# Patient Record
Sex: MALE | Race: ASIAN | NOT HISPANIC OR LATINO | URBAN - METROPOLITAN AREA
[De-identification: names, ages, dates, MRNs, and addresses within clinical notes are randomized per-mention and may not be internally consistent; named-entity substitution may affect disease eponyms.]

---

## 2017-10-17 ENCOUNTER — EMERGENCY (EMERGENCY)
Facility: HOSPITAL | Age: 72
LOS: 1 days | Discharge: HOME | End: 2017-10-17
Attending: EMERGENCY MEDICINE | Admitting: EMERGENCY MEDICINE
Payer: COMMERCIAL

## 2017-10-17 VITALS
RESPIRATION RATE: 14 BRPM | TEMPERATURE: 97 F | DIASTOLIC BLOOD PRESSURE: 72 MMHG | HEART RATE: 86 BPM | OXYGEN SATURATION: 100 % | SYSTOLIC BLOOD PRESSURE: 176 MMHG

## 2017-10-17 VITALS
RESPIRATION RATE: 18 BRPM | DIASTOLIC BLOOD PRESSURE: 77 MMHG | SYSTOLIC BLOOD PRESSURE: 120 MMHG | TEMPERATURE: 98 F | HEART RATE: 82 BPM | OXYGEN SATURATION: 98 %

## 2017-10-17 PROCEDURE — 99284 EMERGENCY DEPT VISIT MOD MDM: CPT

## 2017-10-17 NOTE — ED PROVIDER NOTE - MUSCULOSKELETAL, MLM
Spine appears normal, range of motion is not limited, no muscle or joint tenderness  MIld pain with movement of left leg; no focal tenderness; pelvis stable
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
## 2021-02-12 ENCOUNTER — FORM ENCOUNTER (OUTPATIENT)
Age: 76
End: 2021-02-12

## 2021-02-13 ENCOUNTER — TRANSCRIPTION ENCOUNTER (OUTPATIENT)
Age: 76
End: 2021-02-13

## 2021-02-13 ENCOUNTER — EMERGENCY (EMERGENCY)
Facility: HOSPITAL | Age: 76
LOS: 1 days | Discharge: HOME | End: 2021-02-13
Attending: EMERGENCY MEDICINE | Admitting: EMERGENCY MEDICINE
Payer: MEDICARE

## 2021-02-13 VITALS
SYSTOLIC BLOOD PRESSURE: 121 MMHG | TEMPERATURE: 98 F | HEART RATE: 94 BPM | DIASTOLIC BLOOD PRESSURE: 57 MMHG | RESPIRATION RATE: 18 BRPM | OXYGEN SATURATION: 94 %

## 2021-02-13 LAB
ALBUMIN SERPL ELPH-MCNC: 3.5 G/DL — SIGNIFICANT CHANGE UP (ref 3.3–5)
ALP SERPL-CCNC: 68 U/L — SIGNIFICANT CHANGE UP (ref 40–120)
ALT FLD-CCNC: 53 U/L — HIGH (ref 4–41)
ANION GAP SERPL CALC-SCNC: 9 MMOL/L — SIGNIFICANT CHANGE UP (ref 7–14)
AST SERPL-CCNC: 57 U/L — HIGH (ref 4–40)
BASOPHILS # BLD AUTO: 0.01 K/UL — SIGNIFICANT CHANGE UP (ref 0–0.2)
BASOPHILS NFR BLD AUTO: 0.2 % — SIGNIFICANT CHANGE UP (ref 0–2)
BILIRUB SERPL-MCNC: 0.6 MG/DL — SIGNIFICANT CHANGE UP (ref 0.2–1.2)
BUN SERPL-MCNC: 16 MG/DL — SIGNIFICANT CHANGE UP (ref 7–23)
CALCIUM SERPL-MCNC: 9 MG/DL — SIGNIFICANT CHANGE UP (ref 8.4–10.5)
CHLORIDE SERPL-SCNC: 100 MMOL/L — SIGNIFICANT CHANGE UP (ref 98–107)
CO2 SERPL-SCNC: 25 MMOL/L — SIGNIFICANT CHANGE UP (ref 22–31)
CREAT SERPL-MCNC: 1.11 MG/DL — SIGNIFICANT CHANGE UP (ref 0.5–1.3)
EOSINOPHIL # BLD AUTO: 0.09 K/UL — SIGNIFICANT CHANGE UP (ref 0–0.5)
EOSINOPHIL NFR BLD AUTO: 1.5 % — SIGNIFICANT CHANGE UP (ref 0–6)
GLUCOSE SERPL-MCNC: 107 MG/DL — HIGH (ref 70–99)
HCT VFR BLD CALC: 42.1 % — SIGNIFICANT CHANGE UP (ref 39–50)
HGB BLD-MCNC: 13.8 G/DL — SIGNIFICANT CHANGE UP (ref 13–17)
IANC: 4.18 K/UL — SIGNIFICANT CHANGE UP (ref 1.5–8.5)
IMM GRANULOCYTES NFR BLD AUTO: 0.3 % — SIGNIFICANT CHANGE UP (ref 0–1.5)
LYMPHOCYTES # BLD AUTO: 1.03 K/UL — SIGNIFICANT CHANGE UP (ref 1–3.3)
LYMPHOCYTES # BLD AUTO: 17.2 % — SIGNIFICANT CHANGE UP (ref 13–44)
MCHC RBC-ENTMCNC: 30.6 PG — SIGNIFICANT CHANGE UP (ref 27–34)
MCHC RBC-ENTMCNC: 32.8 GM/DL — SIGNIFICANT CHANGE UP (ref 32–36)
MCV RBC AUTO: 93.3 FL — SIGNIFICANT CHANGE UP (ref 80–100)
MONOCYTES # BLD AUTO: 0.66 K/UL — SIGNIFICANT CHANGE UP (ref 0–0.9)
MONOCYTES NFR BLD AUTO: 11 % — SIGNIFICANT CHANGE UP (ref 2–14)
NEUTROPHILS # BLD AUTO: 4.18 K/UL — SIGNIFICANT CHANGE UP (ref 1.8–7.4)
NEUTROPHILS NFR BLD AUTO: 69.8 % — SIGNIFICANT CHANGE UP (ref 43–77)
NRBC # BLD: 0 /100 WBCS — SIGNIFICANT CHANGE UP
NRBC # FLD: 0 K/UL — SIGNIFICANT CHANGE UP
PLATELET # BLD AUTO: 187 K/UL — SIGNIFICANT CHANGE UP (ref 150–400)
POTASSIUM SERPL-MCNC: 4.7 MMOL/L — SIGNIFICANT CHANGE UP (ref 3.5–5.3)
POTASSIUM SERPL-SCNC: 4.7 MMOL/L — SIGNIFICANT CHANGE UP (ref 3.5–5.3)
PROT SERPL-MCNC: 7.2 G/DL — SIGNIFICANT CHANGE UP (ref 6–8.3)
RBC # BLD: 4.51 M/UL — SIGNIFICANT CHANGE UP (ref 4.2–5.8)
RBC # FLD: 13.4 % — SIGNIFICANT CHANGE UP (ref 10.3–14.5)
SARS-COV-2 RNA SPEC QL NAA+PROBE: DETECTED
SODIUM SERPL-SCNC: 134 MMOL/L — LOW (ref 135–145)
WBC # BLD: 5.99 K/UL — SIGNIFICANT CHANGE UP (ref 3.8–10.5)
WBC # FLD AUTO: 5.99 K/UL — SIGNIFICANT CHANGE UP (ref 3.8–10.5)

## 2021-02-13 PROCEDURE — 99284 EMERGENCY DEPT VISIT MOD MDM: CPT | Mod: GC

## 2021-02-13 PROCEDURE — 71045 X-RAY EXAM CHEST 1 VIEW: CPT | Mod: 26

## 2021-02-13 RX ORDER — SODIUM CHLORIDE 9 MG/ML
1000 INJECTION INTRAMUSCULAR; INTRAVENOUS; SUBCUTANEOUS ONCE
Refills: 0 | Status: COMPLETED | OUTPATIENT
Start: 2021-02-13 | End: 2021-02-13

## 2021-02-13 RX ADMIN — Medication 100 MILLIGRAM(S): at 06:44

## 2021-02-13 RX ADMIN — SODIUM CHLORIDE 1000 MILLILITER(S): 9 INJECTION INTRAMUSCULAR; INTRAVENOUS; SUBCUTANEOUS at 05:06

## 2021-02-13 NOTE — ED PROVIDER NOTE - NS ED ROS FT
General: denies fever, chills  HENT: denies nasal congestion, rhinorrhea  Eyes: denies visual changes, blurred vision  CV: denies chest pain, palpitations  Resp: denies difficulty breathing, cough  Abdominal: denies nausea, vomiting, diarrhea, abdominal pain  : denies urinary pain or discharge  MSK: denies muscle aches, leg swelling  Neuro: weak  Skin: denies rashes, bruises

## 2021-02-13 NOTE — ED ADULT TRIAGE NOTE - CHIEF COMPLAINT QUOTE
Patient diagnosed with Covid on 02/01/21 and experiencing weakness since his diagnose.  Yesterday, He developed nausea, malaise and states, " he doesn't feel well".  Denies SOB or chest pain. Patient diagnosed with Covid on 02/01/21 and experiencing weakness since his diagnoses.  Yesterday, He developed nausea, malaise and states, " he doesn't feel well".  Denies SOB or chest pain.

## 2021-02-13 NOTE — ED PROVIDER NOTE - PROGRESS NOTE DETAILS
Renny, PGY1: Patient's ambulatory saturation was 92+%. Will d/c on prednisone and tessalon pearls and strict return precautions

## 2021-02-13 NOTE — ED PROVIDER NOTE - PATIENT PORTAL LINK FT
You can access the FollowMyHealth Patient Portal offered by [facility] by registering at the following website: [email or web address]. By joining [facility]’s FollowMyHealth portal, you will also be able to view your health information using other applications (apps) compatible with our system.

## 2021-02-13 NOTE — ED ADULT NURSE NOTE - CHIEF COMPLAINT QUOTE
Patient diagnosed with Covid on 02/01/21 and experiencing weakness since his diagnoses.  Yesterday, He developed nausea, malaise and states, " he doesn't feel well".  Denies SOB or chest pain.

## 2021-02-13 NOTE — ED PROVIDER NOTE - ATTENDING CONTRIBUTION TO CARE
MD Ortiz:  I performed a face to face bedside interview with patient regarding history of present illness, review of symptoms and past medical history. I completed an independent physical exam(documented below).  I have discussed patient's plan of care with resident.   I agree with note as stated above, having amended the EMR as needed to reflect my findings. I have discussed the assessment and plan of care.  This includes during the time I functioned as the attending physician for this patient.  PE:  Gen: Alert, NAD  Head: NC, AT,  EOMI, normal lids/conjunctiva  ENT:  normal hearing, patent oropharynx without erythema/exudate  Neck: +supple, no tenderness/meningismus/JVD, +Trachea midline  Chest: no chest wall tenderness, equal chest rise  Pulm: Bilateral BS, normal resp effort, no wheeze/stridor/retractions, mild crackles in posterior bases  CV: RRR, no M/R/G, +dist pulses  Abd: +BS, soft, NT/ND  Rectal: deferred  Mskel: no edema/erythema/cyanosis  Skin: no rash  Neuro: AAOx3  MDM:  76yo M  currently on day 13 or 14 of covid symptoms, c/o general weakness and diarrhea x1 day. Amb sat 93% on RA. Looks well. labs, cxr, fluid, reassess and likely dc w/ meds and return precautions.

## 2021-02-13 NOTE — ED PROVIDER NOTE - NSFOLLOWUPINSTRUCTIONS_ED_ALL_ED_FT
For a 14 day period:  - Please take the prednisone prescription sent to your pharmacy 2 times a day  - Tessalon pearls were also sent to your pharmacy for symptomatic control of your cough  -Stay inside your home as much as possible, avoiding public places or public interaction  -Do not go to work  -If you do enter any public domain, at minimum wear a surgical mask at all times  -Even while indoors, attempt to remain isolated from other individuals such as family or friends, as much as possible  -Return to the Emergency Department for any symptoms such as worsening shortness of breath, significant worsening cough, high fevers despite acetaminophen (Tylenol) or ibuprofen (Motrin/Advil), or severe weakness/malaise

## 2021-02-13 NOTE — ED PROVIDER NOTE - OBJECTIVE STATEMENT
74yo M w/ no significant pmh coming in for weakness and loss of taste since testing + for COVID on 2/1/21. Patient denies fevers/chills, chest pain, or SOB. Home O2 Sat have been 96+%. Patient attempts to stay hydrated but notes decreased PO intake secondary to ageusia. Also notes diarrhea x1 day that occurred 2 days ago but self resolved.

## 2021-02-13 NOTE — ED ADULT NURSE NOTE - OBJECTIVE STATEMENT
Pt arriving to room 21 A&Ox4 ambulatory c/o generalized weakness and decreased PO intake since testing COVID+ on 2/1. RR even and unlabored. Pt denies SOB, CP. Pt states he came to ED because his family was concerned and he feels "dehydrated". O2 sat 95% on RA. IV established with 20G in LAC. Labs drawn and sent. Fluids running as per eMAR. Will continue to monitor.

## 2021-02-13 NOTE — ED PROVIDER NOTE - CLINICAL SUMMARY MEDICAL DECISION MAKING FREE TEXT BOX
76yo M w/ no significant pmh coming in for weakness and loss of taste since testing + for COVID on 2/1/21. Patient's vitals are wnl and not hypoxic on exam--maintained 95+% O2 saturation. Given decreased PO intake, will r/o electrolyte abnormalities vs anemia   Plan: CBC/CMP chest xray + IV hydration

## 2021-02-13 NOTE — ED PROVIDER NOTE - PHYSICAL EXAMINATION
GENERAL: well appearing in no acute distress, non-toxic appearing  HEAD: normocephalic, atraumatic  HENT: airway intact  EYES: normal conjunctiva, oral mucosa moist  CARDIAC: regular rate and rhythm, normal S1S2, no appreciable murmurs, 2+ pulses in UE/LE b/l  PULM: normal breath sounds, clear to ascultation bilaterally, no rales, rhonchi, wheezing  GI: abdomen nondistended, soft, nontender, no guarding, rebound tenderness  NEURO: no focal motor or sensory deficits  MSK: no peripheral edema, no calf tenderness b/l  SKIN: well-perfused, extremities warm, no visible rashes  PSYCH: appropriate mood and affect

## 2021-02-14 ENCOUNTER — OUTPATIENT (OUTPATIENT)
Dept: OUTPATIENT SERVICES | Facility: HOSPITAL | Age: 76
LOS: 1 days | End: 2021-02-14
Payer: SELF-PAY

## 2021-02-14 ENCOUNTER — APPOINTMENT (OUTPATIENT)
Dept: DISASTER EMERGENCY | Facility: HOSPITAL | Age: 76
End: 2021-02-14

## 2021-02-14 VITALS
RESPIRATION RATE: 18 BRPM | DIASTOLIC BLOOD PRESSURE: 73 MMHG | TEMPERATURE: 97 F | SYSTOLIC BLOOD PRESSURE: 150 MMHG | HEART RATE: 85 BPM | WEIGHT: 149.91 LBS | OXYGEN SATURATION: 93 % | HEIGHT: 66 IN

## 2021-02-14 VITALS
TEMPERATURE: 98 F | SYSTOLIC BLOOD PRESSURE: 145 MMHG | DIASTOLIC BLOOD PRESSURE: 74 MMHG | OXYGEN SATURATION: 93 % | RESPIRATION RATE: 18 BRPM | HEART RATE: 76 BPM

## 2021-02-14 DIAGNOSIS — U07.1 COVID-19: ICD-10-CM

## 2021-02-14 PROCEDURE — M0239: CPT

## 2021-02-14 RX ORDER — BAMLANIVIMAB 35 MG/ML
700 INJECTION, SOLUTION INTRAVENOUS ONCE
Refills: 0 | Status: COMPLETED | OUTPATIENT
Start: 2021-02-14 | End: 2021-02-14

## 2021-02-14 RX ORDER — SIMVASTATIN 20 MG/1
1 TABLET, FILM COATED ORAL
Qty: 0 | Refills: 0 | DISCHARGE

## 2021-02-14 RX ORDER — SODIUM CHLORIDE 9 MG/ML
250 INJECTION INTRAMUSCULAR; INTRAVENOUS; SUBCUTANEOUS
Refills: 0 | Status: DISCONTINUED | OUTPATIENT
Start: 2021-02-14 | End: 2021-03-01

## 2021-02-14 RX ORDER — TAMSULOSIN HYDROCHLORIDE 0.4 MG/1
1 CAPSULE ORAL
Qty: 0 | Refills: 0 | DISCHARGE

## 2021-02-14 RX ADMIN — SODIUM CHLORIDE 25 MILLILITER(S): 9 INJECTION INTRAMUSCULAR; INTRAVENOUS; SUBCUTANEOUS at 14:40

## 2021-02-14 RX ADMIN — BAMLANIVIMAB 270 MILLIGRAM(S): 35 INJECTION, SOLUTION INTRAVENOUS at 14:40

## 2021-02-14 NOTE — CHART NOTE - NSCHARTNOTEFT_GEN_A_CORE
CC: Monoclonal Antibody Infusion/COVID 19 Positive  Pt is a 75 year old male Covid + on 2/3/21 with onset on symptoms on 2/5/21 referred by Dr.Wiqas Hodgson who presents to infusion center for Monoclonal antibody infusion Bamlanivimab     exam/findings:  T(C): 36.9 (02-14-21 @ 15:38), Max: 37.1 (02-14-21 @ 15:10)  HR: 79 (02-14-21 @ 15:38) (79 - 85)  BP: 138/74 (02-14-21 @ 15:38) (134/74 - 150/73)  RR: 18 (02-14-21 @ 15:38) (18 - 18)  SpO2: 94% (02-14-21 @ 15:38) (92% - 94%)      PE:   Appearance: NAD	  HEENT:   Normal oral mucosa,   Lymphatic: No lymphadenopathy  Cardiovascular: Normal S1 S2, No JVD, No murmurs, No edema  Respiratory: Lungs clear to auscultation	  Gastrointestinal:  Soft, Non-tender, + BS	  Skin: warm and dry  Neurologic: Non-focal  Extremities: Normal range of motion,    ASSESSMENT:  Pt is a 75 year old male Covid + on 2/3/21 with onset on symptoms on 2/5/21 referred by Dr.Wiqas Hodgson who presents to infusion center for Monoclonal antibody infusion Bamlanivimab   Symptoms/ Criteria: Cough  Risk Profile includes: age > 65 yrs old       PLAN:  - infusion procedure explained to patient   -Consent for monoclonal antibody infusion obtained   - Risk & benifits discussed/all questions answered  -infuse  Bamlanivimab 700 mg IV over one hour   -observe patient for one hour post infusion        I have reviewed the Bamlanivimab  Emergency Use Authorization (EAU) and I have provided the patient or patient's caregiver with the following information:  1. FDA has authorized emergency use of Bamlanivimab , which is not FDA-approved biologic product.  2. The patient or patient's caregiver has the option to accept or refuse administration of Bamlanivimab   3. The significant known and benefits are unknown.  4. Information on available alternative treatments and risks and benefits of those alternatives.    Discharge:  Patient tolerated infusion well denies complaints of chest pain/SOB/dizziness/ palps  Vital signs stable  for discharge home at 4:40 pm.   D/C instructions given/ fact sheet included.  Patient to follow-up with PCP as needed.

## 2021-02-15 ENCOUNTER — TRANSCRIPTION ENCOUNTER (OUTPATIENT)
Age: 76
End: 2021-02-15

## 2021-08-18 ENCOUNTER — APPOINTMENT (OUTPATIENT)
Dept: ORTHOPEDIC SURGERY | Facility: CLINIC | Age: 76
End: 2021-08-18
Payer: MEDICARE

## 2021-08-18 VITALS — WEIGHT: 154 LBS | HEIGHT: 66 IN | BODY MASS INDEX: 24.75 KG/M2

## 2021-08-18 DIAGNOSIS — M51.36 OTHER INTERVERTEBRAL DISC DEGENERATION, LUMBAR REGION: ICD-10-CM

## 2021-08-18 PROCEDURE — 99214 OFFICE O/P EST MOD 30 MIN: CPT

## 2021-08-18 PROCEDURE — 99204 OFFICE O/P NEW MOD 45 MIN: CPT

## 2021-08-18 RX ORDER — SIMVASTATIN 80 MG/1
TABLET, FILM COATED ORAL
Refills: 0 | Status: ACTIVE | COMMUNITY

## 2021-08-18 RX ORDER — TAMSULOSIN HYDROCHLORIDE 0.4 MG/1
CAPSULE ORAL
Refills: 0 | Status: ACTIVE | COMMUNITY

## 2021-08-19 ENCOUNTER — RX RENEWAL (OUTPATIENT)
Age: 76
End: 2021-08-19

## 2021-09-22 ENCOUNTER — RX RENEWAL (OUTPATIENT)
Age: 76
End: 2021-09-22

## 2021-09-22 RX ORDER — MELOXICAM 15 MG/1
15 TABLET ORAL
Qty: 90 | Refills: 0 | Status: ACTIVE | COMMUNITY
Start: 2021-08-18 | End: 1900-01-01

## 2021-11-19 ENCOUNTER — EMERGENCY (EMERGENCY)
Facility: HOSPITAL | Age: 76
LOS: 1 days | Discharge: HOME | End: 2021-11-19
Attending: EMERGENCY MEDICINE | Admitting: EMERGENCY MEDICINE
Payer: MEDICARE

## 2021-11-19 VITALS
RESPIRATION RATE: 18 BRPM | HEART RATE: 94 BPM | DIASTOLIC BLOOD PRESSURE: 55 MMHG | OXYGEN SATURATION: 96 % | SYSTOLIC BLOOD PRESSURE: 124 MMHG | TEMPERATURE: 99 F | HEIGHT: 66 IN

## 2021-11-19 VITALS — SYSTOLIC BLOOD PRESSURE: 123 MMHG | DIASTOLIC BLOOD PRESSURE: 58 MMHG | TEMPERATURE: 100 F | HEART RATE: 85 BPM

## 2021-11-19 LAB
ALBUMIN SERPL ELPH-MCNC: 3.9 G/DL — SIGNIFICANT CHANGE UP (ref 3.3–5)
ALP SERPL-CCNC: 69 U/L — SIGNIFICANT CHANGE UP (ref 40–120)
ALT FLD-CCNC: 23 U/L — SIGNIFICANT CHANGE UP (ref 4–41)
ANION GAP SERPL CALC-SCNC: 9 MMOL/L — SIGNIFICANT CHANGE UP (ref 7–14)
AST SERPL-CCNC: 23 U/L — SIGNIFICANT CHANGE UP (ref 4–40)
BASOPHILS # BLD AUTO: 0.05 K/UL — SIGNIFICANT CHANGE UP (ref 0–0.2)
BASOPHILS NFR BLD AUTO: 0.5 % — SIGNIFICANT CHANGE UP (ref 0–2)
BILIRUB SERPL-MCNC: 0.8 MG/DL — SIGNIFICANT CHANGE UP (ref 0.2–1.2)
BUN SERPL-MCNC: 14 MG/DL — SIGNIFICANT CHANGE UP (ref 7–23)
CALCIUM SERPL-MCNC: 9.3 MG/DL — SIGNIFICANT CHANGE UP (ref 8.4–10.5)
CHLORIDE SERPL-SCNC: 103 MMOL/L — SIGNIFICANT CHANGE UP (ref 98–107)
CO2 SERPL-SCNC: 25 MMOL/L — SIGNIFICANT CHANGE UP (ref 22–31)
CREAT SERPL-MCNC: 1.07 MG/DL — SIGNIFICANT CHANGE UP (ref 0.5–1.3)
EOSINOPHIL # BLD AUTO: 0.31 K/UL — SIGNIFICANT CHANGE UP (ref 0–0.5)
EOSINOPHIL NFR BLD AUTO: 3.4 % — SIGNIFICANT CHANGE UP (ref 0–6)
GLUCOSE SERPL-MCNC: 111 MG/DL — HIGH (ref 70–99)
HCT VFR BLD CALC: 42.8 % — SIGNIFICANT CHANGE UP (ref 39–50)
HGB BLD-MCNC: 14.1 G/DL — SIGNIFICANT CHANGE UP (ref 13–17)
IANC: 7.2 K/UL — SIGNIFICANT CHANGE UP (ref 1.5–8.5)
IMM GRANULOCYTES NFR BLD AUTO: 0.4 % — SIGNIFICANT CHANGE UP (ref 0–1.5)
LYMPHOCYTES # BLD AUTO: 0.87 K/UL — LOW (ref 1–3.3)
LYMPHOCYTES # BLD AUTO: 9.5 % — LOW (ref 13–44)
MCHC RBC-ENTMCNC: 31.6 PG — SIGNIFICANT CHANGE UP (ref 27–34)
MCHC RBC-ENTMCNC: 32.9 GM/DL — SIGNIFICANT CHANGE UP (ref 32–36)
MCV RBC AUTO: 96 FL — SIGNIFICANT CHANGE UP (ref 80–100)
MONOCYTES # BLD AUTO: 0.65 K/UL — SIGNIFICANT CHANGE UP (ref 0–0.9)
MONOCYTES NFR BLD AUTO: 7.1 % — SIGNIFICANT CHANGE UP (ref 2–14)
NEUTROPHILS # BLD AUTO: 7.2 K/UL — SIGNIFICANT CHANGE UP (ref 1.8–7.4)
NEUTROPHILS NFR BLD AUTO: 79.1 % — HIGH (ref 43–77)
NRBC # BLD: 0 /100 WBCS — SIGNIFICANT CHANGE UP
NRBC # FLD: 0 K/UL — SIGNIFICANT CHANGE UP
PLATELET # BLD AUTO: 142 K/UL — LOW (ref 150–400)
POTASSIUM SERPL-MCNC: 4.7 MMOL/L — SIGNIFICANT CHANGE UP (ref 3.5–5.3)
POTASSIUM SERPL-SCNC: 4.7 MMOL/L — SIGNIFICANT CHANGE UP (ref 3.5–5.3)
PROT SERPL-MCNC: 6.9 G/DL — SIGNIFICANT CHANGE UP (ref 6–8.3)
RBC # BLD: 4.46 M/UL — SIGNIFICANT CHANGE UP (ref 4.2–5.8)
RBC # FLD: 14 % — SIGNIFICANT CHANGE UP (ref 10.3–14.5)
SODIUM SERPL-SCNC: 137 MMOL/L — SIGNIFICANT CHANGE UP (ref 135–145)
TROPONIN T, HIGH SENSITIVITY RESULT: 10 NG/L — SIGNIFICANT CHANGE UP
WBC # BLD: 9.12 K/UL — SIGNIFICANT CHANGE UP (ref 3.8–10.5)
WBC # FLD AUTO: 9.12 K/UL — SIGNIFICANT CHANGE UP (ref 3.8–10.5)

## 2021-11-19 PROCEDURE — 99285 EMERGENCY DEPT VISIT HI MDM: CPT | Mod: 25

## 2021-11-19 PROCEDURE — 93010 ELECTROCARDIOGRAM REPORT: CPT

## 2021-11-19 RX ORDER — ONDANSETRON 8 MG/1
4 TABLET, FILM COATED ORAL ONCE
Refills: 0 | Status: COMPLETED | OUTPATIENT
Start: 2021-11-19 | End: 2021-11-19

## 2021-11-19 RX ORDER — SODIUM CHLORIDE 9 MG/ML
1000 INJECTION INTRAMUSCULAR; INTRAVENOUS; SUBCUTANEOUS ONCE
Refills: 0 | Status: COMPLETED | OUTPATIENT
Start: 2021-11-19 | End: 2021-11-19

## 2021-11-19 RX ORDER — ACETAMINOPHEN 500 MG
650 TABLET ORAL ONCE
Refills: 0 | Status: COMPLETED | OUTPATIENT
Start: 2021-11-19 | End: 2021-11-19

## 2021-11-19 RX ADMIN — SODIUM CHLORIDE 1000 MILLILITER(S): 9 INJECTION INTRAMUSCULAR; INTRAVENOUS; SUBCUTANEOUS at 09:32

## 2021-11-19 RX ADMIN — Medication 650 MILLIGRAM(S): at 09:31

## 2021-11-19 NOTE — ED PROVIDER NOTE - CLINICAL SUMMARY MEDICAL DECISION MAKING FREE TEXT BOX
Impression: 77yo M pmhx of HLD, BPH, COVID+ (2 weeks prior) comes to ED w/ a pre-syncopal episode at 7am. Their symptoms of lightheadedness, nausea, decreased fluid intake are consistent with orthostatic syncope. Plan to rule out acs or arrhythmic causes.    Ordered labs, medications for diagnosis, management, and treatment.

## 2021-11-19 NOTE — ED PROVIDER NOTE - QRS
73 Alert and oriented to person, place, time/situation. normal mood and affect. no apparent risk to self or others.

## 2021-11-19 NOTE — ED PROVIDER NOTE - ATTENDING CONTRIBUTION TO CARE
Patient is a 75 yo M with history of hyperlipidemia, BPH, covid positive 3 weeks ago, s/p 3rd Pfizer Booster shot yesterday afternoon p/w near syncope episode. Patient reports fever and chills last night after getting the booster vaccine. This morning he woke up and went to the bathroom, felt dizzy and like he was going to pass out and fell towards the toilet. Denies loc or head trauma. No chest pain, shortness of breath, nausea, vomiting, leg swelling. He states he was not symptomatic for COVID 3 weeks ago, but tested positive when he was trying to get clearance to make a trip to [facility]. He tested negative yesterday, so got the booster.    VS noted  Gen. no acute distress, Non toxic   HEENT: EOMI, mmm  Lungs: CTAB/L no C/ W /R   CVS: RRR   Abd; Soft non tender, non distended   Ext: no edema  Skin: no rash  Neuro AAOx3 non focal clear speech  a/p: near syncope - plan for ekg, orthostatics, labs including trop. Symptoms likely related to booster vaccine.   - Barbie ARANGO

## 2021-11-19 NOTE — ED ADULT TRIAGE NOTE - CHIEF COMPLAINT QUOTE
Pt was covid positive 2 weeks ago and got the Pfizer booster within 2 days ago.  Pt had witnessed near syncopal episode at home.  Pt did not fall and did not have LOC.  EKG in triage.  fs = 136 by ems.  Pt is not diabetic.

## 2021-11-19 NOTE — ED PROVIDER NOTE - OBJECTIVE STATEMENT
75yo M pmhx of HLD, BPH, COVID+ (2 weeks prior) comes to ED w/ pre-syncopal episode at 7am. Went to the bathroom and fell slightly against the toilet. Denies fall to ground, head trauma, LOC. Reports that he has not been drinking water as much and was a little dehydrated. Reports nausea, light headed ness when symptoms started. Denies chest pain, SOB, palpitations, vomiting, abd pain, focal weakness. Received COVID booster yesterday.

## 2021-11-19 NOTE — ED PROVIDER NOTE - NSFOLLOWUPINSTRUCTIONS_ED_ALL_ED_FT
Syncope    Syncope is when you temporarily lose consciousness, also called fainting or passing out. It is caused by a sudden decrease in blood flow to the brain. Even though most causes of syncope are not dangerous, syncope can possibly be a sign of a serious medical problem. Signs that you may be about to faint include feeling dizzy, lightheaded, nausea, visual changes, or cold/clammy skin. Do not drive, operate heavy machinery, or play sports until your health care provider says it is okay.    Follow up with your primary care doctor if you experience similar symptoms.     SEEK IMMEDIATE MEDICAL CARE IF YOU HAVE ANY OF THE FOLLOWING SYMPTOMS: severe headache, pain in your chest/abdomen/back, bleeding from your mouth or rectum, palpitations, shortness of breath, pain with breathing, seizure, confusion, or trouble walking.

## 2021-11-19 NOTE — ED PROVIDER NOTE - NS ED ROS FT
Constitutional: no fevers, chills  HEENT: no cough, rhinorrhea  Cardiac: no chest pain, palpitations  Respiratory: no SOB  GI: nausea. no vomiting, abd pain, bloody or dark stools  : no dysuria, frequency, or hematuria  MSK: no joint pain  Skin: no rashes  Neuro: lightheadedness, pre-syncope. no headache, change in vision, weakness  Psych: negative

## 2021-11-19 NOTE — ED ADULT NURSE NOTE - NSICDXPASTMEDICALHX_GEN_ALL_CORE_FT
PAST MEDICAL HISTORY:  History of BPH     HLD (hyperlipidemia)     No pertinent past medical history

## 2021-11-19 NOTE — ED ADULT NURSE NOTE - OBJECTIVE STATEMENT
A&Ox4, ambulatory c/o near syncope. PMH of hyperlipidemia and BPH.  Pt states he was positive for COVID x 2weeks ago. Pt states receiving COVID booster on 11/18/2021. Pt states having a fever of 102F yesterday night (11/18/2021) and chills. Pt states getting up to bathroom and feeling faint. Denies LOC, falling. Denies SOB, chest pain, palpitations. 18G LAC placed by EMS. Lab drawn as per MD orders. Placed on cardiac monitor with NSR in display. Bed at lowest position, side rails up and call bell within reach. Will continue to monitor

## 2021-11-19 NOTE — ED PROVIDER NOTE - PATIENT PORTAL LINK FT
You can access the FollowMyHealth Patient Portal offered by [facility] by registering at the following website: [email or web address]. By joining [facility]’s FollowMyHealth portal, you will also be able to view your health information using other applications (apps) compatible with our system.

## 2021-11-19 NOTE — ED PROVIDER NOTE - NSICDXPASTMEDICALHX_GEN_ALL_CORE_FT
PAST MEDICAL HISTORY:  No pertinent past medical history      PAST MEDICAL HISTORY:  History of BPH     HLD (hyperlipidemia)     No pertinent past medical history

## 2021-11-19 NOTE — ED PROVIDER NOTE - PHYSICAL EXAMINATION
General: non-toxic, NAD  HEENT: NCAT, PERRL  Cardiac: RRR, no murmurs, 2+ peripheral pulses  Chest: CTAB  Abdomen: soft, non-distended, bowel sounds present, no ttp, no rebound or guarding  Extremities: no peripheral edema, calf tenderness, or leg size discrepancies  Skin: no rashes  Neuro: AAOx4, 5+motor, sensory grossly intact. Normal gait.   Psych: mood and affect appropriate

## 2021-11-19 NOTE — ED PROVIDER NOTE - PROGRESS NOTE DETAILS
Spoke to patient about results and lack of urgent or emergent pathology at this time. Plan to discharge patient. Patient given follow up and return precautions. Patient and family agree with plan. Spoke to patient about results and lack of urgent or emergent pathology at this time. Plan to discharge patient. Patient given follow up and return precautions. Patient agree with plan.

## 2022-04-01 NOTE — ED ADULT TRIAGE NOTE - LOCATION:
Right arm; Intermediate Repair Preamble Text (Leave Blank If You Do Not Want): Undermining was performed with blunt dissection.

## 2023-03-13 NOTE — ED ADULT NURSE NOTE - PAIN RATING/NUMBER SCALE (0-10): ACTIVITY
[facility]  Gastroenterology  [address] [place] [address]  Phone: [phone]  Fax: [phone]    [facility]  Internal Medicine  [address] [place] [address]  Phone: [phone]  Fax: [phone]     0

## 2024-08-13 NOTE — ED ADULT TRIAGE NOTE - TEMPERATURE IN CELSIUS (DEGREES C)
Quality 130: Documentation Of Current Medications In The Medical Record: Current Medications Documented
Quality 431: Preventive Care And Screening: Unhealthy Alcohol Use - Screening: Patient not identified as an unhealthy alcohol user when screened for unhealthy alcohol use using a systematic screening method
Quality 226: Preventive Care And Screening: Tobacco Use: Screening And Cessation Intervention: Patient screened for tobacco use and is an ex/non-smoker
Detail Level: Detailed
37.4

## 2024-10-28 ENCOUNTER — RESULT REVIEW (OUTPATIENT)
Age: 79
End: 2024-10-28

## 2024-10-28 ENCOUNTER — INPATIENT (INPATIENT)
Facility: HOSPITAL | Age: 79
LOS: 1 days | Discharge: HOME | End: 2024-10-30
Attending: INTERNAL MEDICINE | Admitting: INTERNAL MEDICINE
Payer: MEDICARE

## 2024-10-28 VITALS
WEIGHT: 160.06 LBS | OXYGEN SATURATION: 99 % | RESPIRATION RATE: 16 BRPM | HEART RATE: 65 BPM | DIASTOLIC BLOOD PRESSURE: 57 MMHG | TEMPERATURE: 98 F | SYSTOLIC BLOOD PRESSURE: 92 MMHG

## 2024-10-28 DIAGNOSIS — R55 SYNCOPE AND COLLAPSE: ICD-10-CM

## 2024-10-28 DIAGNOSIS — E87.20 ACIDOSIS, UNSPECIFIED: ICD-10-CM

## 2024-10-28 DIAGNOSIS — Z87.438 PERSONAL HISTORY OF OTHER DISEASES OF MALE GENITAL ORGANS: ICD-10-CM

## 2024-10-28 DIAGNOSIS — I95.9 HYPOTENSION, UNSPECIFIED: ICD-10-CM

## 2024-10-28 DIAGNOSIS — D72.829 ELEVATED WHITE BLOOD CELL COUNT, UNSPECIFIED: ICD-10-CM

## 2024-10-28 DIAGNOSIS — E78.5 HYPERLIPIDEMIA, UNSPECIFIED: ICD-10-CM

## 2024-10-28 DIAGNOSIS — R42 DIZZINESS AND GIDDINESS: ICD-10-CM

## 2024-10-28 DIAGNOSIS — I77.74 DISSECTION OF VERTEBRAL ARTERY: ICD-10-CM

## 2024-10-28 DIAGNOSIS — R93.89 ABNORMAL FINDINGS ON DIAGNOSTIC IMAGING OF OTHER SPECIFIED BODY STRUCTURES: ICD-10-CM

## 2024-10-28 LAB
ADD ON TEST-SPECIMEN IN LAB: SIGNIFICANT CHANGE UP
ADD ON TEST-SPECIMEN IN LAB: SIGNIFICANT CHANGE UP
ALBUMIN SERPL ELPH-MCNC: 3.7 G/DL — SIGNIFICANT CHANGE UP (ref 3.3–5)
ALP SERPL-CCNC: 62 U/L — SIGNIFICANT CHANGE UP (ref 40–120)
ALT FLD-CCNC: 13 U/L — SIGNIFICANT CHANGE UP (ref 4–41)
ANION GAP SERPL CALC-SCNC: 11 MMOL/L — SIGNIFICANT CHANGE UP (ref 7–14)
APPEARANCE UR: CLEAR — SIGNIFICANT CHANGE UP
APTT BLD: 33.2 SEC — SIGNIFICANT CHANGE UP (ref 24.5–35.6)
AST SERPL-CCNC: 18 U/L — SIGNIFICANT CHANGE UP (ref 4–40)
BACTERIA # UR AUTO: NEGATIVE /HPF — SIGNIFICANT CHANGE UP
BASOPHILS # BLD AUTO: 0.04 K/UL — SIGNIFICANT CHANGE UP (ref 0–0.2)
BASOPHILS NFR BLD AUTO: 0.4 % — SIGNIFICANT CHANGE UP (ref 0–2)
BILIRUB SERPL-MCNC: 0.8 MG/DL — SIGNIFICANT CHANGE UP (ref 0.2–1.2)
BILIRUB UR-MCNC: NEGATIVE — SIGNIFICANT CHANGE UP
BLOOD GAS VENOUS COMPREHENSIVE RESULT: SIGNIFICANT CHANGE UP
BUN SERPL-MCNC: 14 MG/DL — SIGNIFICANT CHANGE UP (ref 7–23)
CALCIUM SERPL-MCNC: 8.9 MG/DL — SIGNIFICANT CHANGE UP (ref 8.4–10.5)
CAST: 0 /LPF — SIGNIFICANT CHANGE UP (ref 0–4)
CHLORIDE SERPL-SCNC: 106 MMOL/L — SIGNIFICANT CHANGE UP (ref 98–107)
CK SERPL-CCNC: 114 U/L — SIGNIFICANT CHANGE UP (ref 30–200)
CK SERPL-CCNC: 47 U/L — SIGNIFICANT CHANGE UP (ref 30–200)
CO2 SERPL-SCNC: 22 MMOL/L — SIGNIFICANT CHANGE UP (ref 22–31)
COLOR SPEC: YELLOW — SIGNIFICANT CHANGE UP
CREAT SERPL-MCNC: 1.01 MG/DL — SIGNIFICANT CHANGE UP (ref 0.5–1.3)
DIFF PNL FLD: NEGATIVE — SIGNIFICANT CHANGE UP
EGFR: 75 ML/MIN/1.73M2 — SIGNIFICANT CHANGE UP
EOSINOPHIL # BLD AUTO: 0.72 K/UL — HIGH (ref 0–0.5)
EOSINOPHIL NFR BLD AUTO: 6.7 % — HIGH (ref 0–6)
FLUAV AG NPH QL: SIGNIFICANT CHANGE UP
FLUBV AG NPH QL: SIGNIFICANT CHANGE UP
GAS PNL BLDV: SIGNIFICANT CHANGE UP
GLUCOSE SERPL-MCNC: 139 MG/DL — HIGH (ref 70–99)
GLUCOSE UR QL: NEGATIVE MG/DL — SIGNIFICANT CHANGE UP
HCT VFR BLD CALC: 39.3 % — SIGNIFICANT CHANGE UP (ref 39–50)
HGB BLD-MCNC: 13.4 G/DL — SIGNIFICANT CHANGE UP (ref 13–17)
IANC: 5.88 K/UL — SIGNIFICANT CHANGE UP (ref 1.8–7.4)
IMM GRANULOCYTES NFR BLD AUTO: 0.3 % — SIGNIFICANT CHANGE UP (ref 0–0.9)
INR BLD: 0.99 RATIO — SIGNIFICANT CHANGE UP (ref 0.85–1.16)
KETONES UR-MCNC: NEGATIVE MG/DL — SIGNIFICANT CHANGE UP
LEUKOCYTE ESTERASE UR-ACNC: NEGATIVE — SIGNIFICANT CHANGE UP
LYMPHOCYTES # BLD AUTO: 3.3 K/UL — SIGNIFICANT CHANGE UP (ref 1–3.3)
LYMPHOCYTES # BLD AUTO: 30.7 % — SIGNIFICANT CHANGE UP (ref 13–44)
MAGNESIUM SERPL-MCNC: 1.9 MG/DL — SIGNIFICANT CHANGE UP (ref 1.6–2.6)
MCHC RBC-ENTMCNC: 32.2 PG — SIGNIFICANT CHANGE UP (ref 27–34)
MCHC RBC-ENTMCNC: 34.1 GM/DL — SIGNIFICANT CHANGE UP (ref 32–36)
MCV RBC AUTO: 94.5 FL — SIGNIFICANT CHANGE UP (ref 80–100)
MONOCYTES # BLD AUTO: 0.79 K/UL — SIGNIFICANT CHANGE UP (ref 0–0.9)
MONOCYTES NFR BLD AUTO: 7.3 % — SIGNIFICANT CHANGE UP (ref 2–14)
NEUTROPHILS # BLD AUTO: 5.88 K/UL — SIGNIFICANT CHANGE UP (ref 1.8–7.4)
NEUTROPHILS NFR BLD AUTO: 54.6 % — SIGNIFICANT CHANGE UP (ref 43–77)
NITRITE UR-MCNC: NEGATIVE — SIGNIFICANT CHANGE UP
NRBC # BLD: 0 /100 WBCS — SIGNIFICANT CHANGE UP (ref 0–0)
NRBC # FLD: 0 K/UL — SIGNIFICANT CHANGE UP (ref 0–0)
NT-PROBNP SERPL-SCNC: 45 PG/ML — SIGNIFICANT CHANGE UP
PH UR: 7.5 — SIGNIFICANT CHANGE UP (ref 5–8)
PHOSPHATE SERPL-MCNC: 2.9 MG/DL — SIGNIFICANT CHANGE UP (ref 2.5–4.5)
PLATELET # BLD AUTO: 152 K/UL — SIGNIFICANT CHANGE UP (ref 150–400)
POTASSIUM SERPL-MCNC: 4.6 MMOL/L — SIGNIFICANT CHANGE UP (ref 3.5–5.3)
POTASSIUM SERPL-SCNC: 4.6 MMOL/L — SIGNIFICANT CHANGE UP (ref 3.5–5.3)
PROCALCITONIN SERPL-MCNC: 0.05 NG/ML — SIGNIFICANT CHANGE UP (ref 0.02–0.1)
PROT SERPL-MCNC: 6.5 G/DL — SIGNIFICANT CHANGE UP (ref 6–8.3)
PROT UR-MCNC: SIGNIFICANT CHANGE UP MG/DL
PROTHROM AB SERPL-ACNC: 11.8 SEC — SIGNIFICANT CHANGE UP (ref 9.9–13.4)
RBC # BLD: 4.16 M/UL — LOW (ref 4.2–5.8)
RBC # FLD: 13.4 % — SIGNIFICANT CHANGE UP (ref 10.3–14.5)
RBC CASTS # UR COMP ASSIST: 1 /HPF — SIGNIFICANT CHANGE UP (ref 0–4)
REVIEW: SIGNIFICANT CHANGE UP
RSV RNA NPH QL NAA+NON-PROBE: SIGNIFICANT CHANGE UP
S PNEUM AG UR QL: NEGATIVE — SIGNIFICANT CHANGE UP
SARS-COV-2 RNA SPEC QL NAA+PROBE: SIGNIFICANT CHANGE UP
SODIUM SERPL-SCNC: 139 MMOL/L — SIGNIFICANT CHANGE UP (ref 135–145)
SP GR SPEC: 1.09 — HIGH (ref 1–1.03)
SQUAMOUS # UR AUTO: 0 /HPF — SIGNIFICANT CHANGE UP (ref 0–5)
TROPONIN T, HIGH SENSITIVITY RESULT: 9 NG/L — SIGNIFICANT CHANGE UP
UROBILINOGEN FLD QL: 0.2 MG/DL — SIGNIFICANT CHANGE UP (ref 0.2–1)
WBC # BLD: 10.76 K/UL — HIGH (ref 3.8–10.5)
WBC # FLD AUTO: 10.76 K/UL — HIGH (ref 3.8–10.5)
WBC UR QL: 0 /HPF — SIGNIFICANT CHANGE UP (ref 0–5)

## 2024-10-28 PROCEDURE — 70498 CT ANGIOGRAPHY NECK: CPT | Mod: 26,MC

## 2024-10-28 PROCEDURE — 70551 MRI BRAIN STEM W/O DYE: CPT | Mod: 26

## 2024-10-28 PROCEDURE — 70450 CT HEAD/BRAIN W/O DYE: CPT | Mod: 26,MC,XU

## 2024-10-28 PROCEDURE — 99223 1ST HOSP IP/OBS HIGH 75: CPT

## 2024-10-28 PROCEDURE — 70496 CT ANGIOGRAPHY HEAD: CPT | Mod: 26,MC

## 2024-10-28 PROCEDURE — 93306 TTE W/DOPPLER COMPLETE: CPT | Mod: 26

## 2024-10-28 PROCEDURE — 70548 MR ANGIOGRAPHY NECK W/DYE: CPT | Mod: 26

## 2024-10-28 PROCEDURE — 99291 CRITICAL CARE FIRST HOUR: CPT | Mod: 25

## 2024-10-28 PROCEDURE — 70544 MR ANGIOGRAPHY HEAD W/O DYE: CPT | Mod: 26,XU

## 2024-10-28 PROCEDURE — 71045 X-RAY EXAM CHEST 1 VIEW: CPT | Mod: 26

## 2024-10-28 RX ORDER — ASPIRIN/MAG CARB/ALUMINUM AMIN 325 MG
81 TABLET ORAL DAILY
Refills: 0 | Status: DISCONTINUED | OUTPATIENT
Start: 2024-10-29 | End: 2024-10-30

## 2024-10-28 RX ORDER — ENOXAPARIN SODIUM 40MG/0.4ML
40 SYRINGE (ML) SUBCUTANEOUS EVERY 24 HOURS
Refills: 0 | Status: DISCONTINUED | OUTPATIENT
Start: 2024-10-28 | End: 2024-10-30

## 2024-10-28 RX ORDER — ASPIRIN/MAG CARB/ALUMINUM AMIN 325 MG
81 TABLET ORAL DAILY
Refills: 0 | Status: DISCONTINUED | OUTPATIENT
Start: 2024-10-28 | End: 2024-10-28

## 2024-10-28 RX ORDER — SIMVASTATIN 80 MG/1
1 TABLET, FILM COATED ORAL
Refills: 0 | DISCHARGE

## 2024-10-28 RX ORDER — TAMSULOSIN HCL 0.4 MG
0.4 CAPSULE ORAL AT BEDTIME
Refills: 0 | Status: DISCONTINUED | OUTPATIENT
Start: 2024-10-28 | End: 2024-10-30

## 2024-10-28 RX ORDER — AZITHROMYCIN DIHYDRATE 200 MG/5ML
500 POWDER, FOR SUSPENSION ORAL ONCE
Refills: 0 | Status: COMPLETED | OUTPATIENT
Start: 2024-10-28 | End: 2024-10-28

## 2024-10-28 RX ORDER — CLOPIDOGREL 75 MG/1
75 TABLET ORAL ONCE
Refills: 0 | Status: COMPLETED | OUTPATIENT
Start: 2024-10-28 | End: 2024-10-28

## 2024-10-28 RX ORDER — SILDENAFIL CITRATE 100 MG
0 TABLET ORAL
Refills: 0 | DISCHARGE

## 2024-10-28 RX ORDER — CLOPIDOGREL 75 MG/1
75 TABLET ORAL DAILY
Refills: 0 | Status: DISCONTINUED | OUTPATIENT
Start: 2024-10-29 | End: 2024-10-29

## 2024-10-28 RX ORDER — CEFTRIAXONE SODIUM 10 G
1000 VIAL (EA) INJECTION ONCE
Refills: 0 | Status: COMPLETED | OUTPATIENT
Start: 2024-10-28 | End: 2024-10-28

## 2024-10-28 RX ADMIN — Medication 81 MILLIGRAM(S): at 07:35

## 2024-10-28 RX ADMIN — Medication 20 MILLIGRAM(S): at 21:18

## 2024-10-28 RX ADMIN — AZITHROMYCIN DIHYDRATE 255 MILLIGRAM(S): 200 POWDER, FOR SUSPENSION ORAL at 08:14

## 2024-10-28 RX ADMIN — CLOPIDOGREL 75 MILLIGRAM(S): 75 TABLET ORAL at 07:35

## 2024-10-28 RX ADMIN — Medication 100 MILLIGRAM(S): at 07:05

## 2024-10-28 RX ADMIN — Medication 0.4 MILLIGRAM(S): at 21:17

## 2024-10-28 NOTE — CONSULT NOTE ADULT - SUBJECTIVE AND OBJECTIVE BOX
~~~~~~~~~~~~~~~~~~~~~~~~~~~~~~~~~~~~~~~~~~~~~~~~~~  [facility] [id number], [phone]  [facility] Stroke Consult Service , [phone] (7a-7p, otherwise call [phone])  ~~~~~~~~~~~~~~~~~~~~~~~~~~~~~~~~~~~~~~~~~~~~~~~~~~  History:    INCOMPLETE  Chief complaint:  HPI:   SIMA ANDINO is a 79year old right-handed Man with PMHx HTN, HLD, BPH who presents for syncope and vertigo. LKW 2am 10/228/24, went to sleep, woke up 4am, got up felt lightheaded and room spinning, lost consciousness next woke up in ambulance. vertigo recurred with  head movement. Now in c collar, small head motions no longer reproducing vertigo at time of code. Deneis weakness, numbness, commubnicating difficulty, visual disturbance. NMotes recent flu vaccine    LKN: 2am 10/28/24  NIHSS: 0  preMRS: 0   Pt is not a candidate for tenecteplase due to no deficits, doubt ischemic stroke   Pt is not a candidate for mechanical thrombectomy due to no deficvits  pendint CTA     Review of Systems:    CONSTITUTIONAL: No fevers or chills  EYES AND ENT: No visual changes or no throat pain   NECK: No pain or stiffness  RESPIRATORY: No hemoptysis or shortness of breath  CARDIOVASCULAR: No chest pain or palpitations  GASTROINTESTINAL: No melena or hematochezia  GENITOURINARY: No dysuria or hematuria  NEUROLOGICAL: +As stated in HPI above  SKIN: No itching, burning, rashes, or lesions   All other review of systems is negative unless indicated above.    PMHx:  No pertinent past medical history  HTN (hypertension)  HLD (hyperlipidemia)  History of BPH    Social History:  Lives with: family  Alcohol use: no  Tobacco use:  never  Other drug use: no  Profession: Retired airline worker  ADLs: Independent  no assistive devices     Medications  Home Medications:  simvastatin 20 mg oral tablet: 1 tab(s) orally once a day (at bedtime) (14 Feb 2021 14:28)  tamsulosin 0.4 mg oral capsule: 1 cap(s) orally once a day (14 Feb 2021 14:28)    MEDICATIONS  (STANDING):    MEDICATIONS  (PRN):      Exam:  Vitals:  T(C): 36.7 (10-28-24 @ 04:54), Max: 36.7 (10-28-24 @ 04:54)  T(F): 98 (10-28-24 @ 04:54), Max: 98 (10-28-24 @ 04:54)  HR: 65 (10-28-24 @ 04:54) (65 - 65)  BP: 92/57 (10-28-24 @ 04:54) (92/57 - 92/57)  RR: 16 (10-28-24 @ 04:54) (16 - 16)  SpO2: 99% (10-28-24 @ 04:54) (99% - 99%)  I&O's Summary    Physical and Neurological Examination:   - General: nad, supine in c collar     - Mental Status:   level of consciousness: Awake, alert  Orientation: Oriented to person, age, place, and time  Language: Speech is fluent with intact naming, repetition, and ability to follow commands (including simple commands and complex cross commands)  Cortical Signs: No extinction to visual or tactile DSS, no hemineglect.   good historian    - Cranial Nerves:   PERRL, VFF, EOMI left beating nystagmus , facial sensation is intact in the V1-V3 distribution bilaterally; face is symmetric with nael smile; hearing is intact to finger rub bilaterally and equally; uvula is midline and soft palate rises symmetrically; shoulder shrug intact; tongue protrudes in the midline with intact lateral movements    - Motor Testing:  Bulk: Normal   Tone: Normal  Strength:  There is no pronator drift b/l  There is no leg drift b/l                              Right           Left  Should-Abduct      5                   5  Elbow-Flex             5                   5  Elbow-Ext              5                   5                        5                   5    Hip-Flex                 5                   5  Knee-Flex              5                   5  Knee-Ext                5                   5    - Sensory: Intact throughout to light touch.   - Coordination: Finger-nose-finger intact without dysmetria.  Heel shin intact no dysmetria, mildly trmulous in legs.   - Gait: Normal steps, base, arm swing,     Objective Studies  Labs:                          13.4   10.76 )-----------( 152      ( 28 Oct 2024 05:32 )             39.3     POCT Blood Glucose.: 139 mg/dL (28 Oct 2024 04:53)           ~~~~~~~~~~~~~~~~~~~~~~~~~~~~~~~~~~~~~~~~~~~~~~~~~~  [facility] [id number], [phone]  [facility] Stroke Consult Service , Spectra-[phone] (7a-7p, otherwise call [phone])  ~~~~~~~~~~~~~~~~~~~~~~~~~~~~~~~~~~~~~~~~~~~~~~~~~~  History:    HPI:   SIMA ANDINO is a 79year old right-handed Man with PMHx HTN, HLD, BPH who presents for syncope and vertigo. LKW 2am 10/228/24, went to sleep, woke up 4am, got up felt lightheaded and room spinning, lost consciousness next woke up in ambulance. vertigo recurred with  head movement. Now in c collar, small head motions no longer reproducing vertigo at time of code. Denies head/neck pain, weakness, numbness, communicating difficulty, visual disturbance. Notes recent flu vaccine as well as constipation for whhich he look laxatives 10/27 with success.     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    PMHx:  No pertinent past medical history  HTN (hypertension)  HLD (hyperlipidemia)  History of BPH    Social History:  Lives with: family  Alcohol use: no  Tobacco use:  never  Other drug use: no  Profession: Retired airline worker  ADLs: Independent  no assistive devices     Medications  Home Medications:  simvastatin 20 mg oral tablet: 1 tab(s) orally once a day (at bedtime) (14 Feb 2021 14:28)  tamsulosin 0.4 mg oral capsule: 1 cap(s) orally once a day (14 Feb 2021 14:28)    MEDICATIONS  (STANDING):    MEDICATIONS  (PRN):      Exam:  Vitals:  T(C): 36.7 (10-28-24 @ 04:54), Max: 36.7 (10-28-24 @ 04:54)  T(F): 98 (10-28-24 @ 04:54), Max: 98 (10-28-24 @ 04:54)  HR: 65 (10-28-24 @ 04:54) (65 - 65)  BP: 92/57 (10-28-24 @ 04:54) (92/57 - 92/57)  RR: 16 (10-28-24 @ 04:54) (16 - 16)  SpO2: 99% (10-28-24 @ 04:54) (99% - 99%)  I&O's Summary    Physical and Neurological Examination:   - General: nad, supine in c collar     - Mental Status:   level of consciousness: Awake, alert  Orientation: Oriented to person, age, place, and time  Language: Speech is fluent with intact naming, repetition, and ability to follow commands (including simple commands and complex cross commands)  Cortical Signs: No extinction to visual or tactile DSS, no hemineglect.   good historian    HINTS  -Left beating nystagmus worse on left gaze, no skew, +corrective saccade    - Cranial Nerves:   PERRL, VFF, EOMI left beating nystagmus , facial sensation is intact in the V1-V3 distribution bilaterally; face is symmetric with nael smile; hearing is intact to finger rub bilaterally and equally; uvula is midline and soft palate rises symmetrically; shoulder shrug intact; tongue protrudes in the midline with intact lateral movements    - Motor Testing:  Bulk: Normal   Tone: Normal  Strength:  There is no pronator drift b/l  There is no leg drift b/l                              Right           Left  Should-Abduct      5                   5  Elbow-Flex             5                   5  Elbow-Ext              5                   5                        5                   5    Hip-Flex                 5                   5  Knee-Flex              5                   5  Knee-Ext                5                   5    - Sensory: Intact throughout to light touch.   - Coordination: Finger-nose-finger intact without dysmetria.  Heel shin intact no dysmetria, mildly trmulous in legs.   - Gait: Normal steps, base, arm swing,     Objective Studies                        13.4   10.76 )-----------( 152      ( 28 Oct 2024 05:32 )             39.3       10-28    139  |  106  |  14  ----------------------------<  139[H]  4.6   |  22  |  1.01    Ca    8.9      28 Oct 2024 05:32  Phos  2.9     10-28  Mg     1.90     10-28    TPro  6.5  /  Alb  3.7  /  TBili  0.8  /  DBili  x   /  AST  18  /  ALT  13  /  AlkPhos  62  10-28       PT/INR - ( 28 Oct 2024 05:32 )   PT: 11.8 sec;   INR: 0.99 ratio         PTT - ( 28 Oct 2024 05:32 )  PTT:33.2 sec        CTH  FINDINGS:    VENTRICLES AND SULCI: Age appropriate involutional changes.  INTRA-AXIAL: No mass effect, acute hemorrhage, or midline shift.  There   are periventricular and subcortical white matter hypodensities,   consistent with microvascular type changes.  EXTRA-AXIAL: No mass or fluid collection. Basal cisterns are normal in   appearance.    VISUALIZED SINUSES:  Mucosal thickening in the left maxillary and   bilateral sphenoidal sinuses.  TYMPANOMASTOID CAVITIES:  Clear.  VISUALIZED ORBITS: Bilateral lens replacement.  CALVARIUM: Intact.    MISCELLANEOUS: None.      IMPRESSION:  No acute intracranial hemorrhage, mass effect, or midline shift.    Findings were discussed with Dr. Carney 10/28/2024 5:49 AM by Dr. Mcbride   with read back confirmation.       ~~~~~~~~~~~~~~~~~~~~~~~~~~~~~~~~~~~~~~~~~~~~~~~~~~  [facility] [id number], [phone]  [facility] Stroke Consult Service , [phone] (7a-7p, otherwise call [phone])  ~~~~~~~~~~~~~~~~~~~~~~~~~~~~~~~~~~~~~~~~~~~~~~~~~~  History:    HPI:   SIMA ANDINO is a 79year old right-handed Man with PMHx HTN, HLD, BPH not on AC/AP who presents for syncope and vertigo. LKW 2am 10/228/24, went to sleep, woke up 4am, got up felt lightheaded and room spinning, lost consciousness next woke up in ambulance. vertigo recurred with  head movement. Now in c collar, small head motions no longer reproducing vertigo at time of code. Denies head/neck pain, weakness, numbness, communicating difficulty, visual disturbance. Notes recent flu vaccine as well as constipation for whhich he look laxatives 10/27 with success.     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    PMHx:  No pertinent past medical history  HTN (hypertension)  HLD (hyperlipidemia)  History of BPH    Social History:  Lives with: family  Alcohol use: no  Tobacco use:  never  Other drug use: no  Profession: Retired airline worker  ADLs: Independent  no assistive devices     Medications  Home Medications:  simvastatin 20 mg oral tablet: 1 tab(s) orally once a day (at bedtime) (14 Feb 2021 14:28)  tamsulosin 0.4 mg oral capsule: 1 cap(s) orally once a day (14 Feb 2021 14:28)    MEDICATIONS  (STANDING):    MEDICATIONS  (PRN):      Exam:  Vitals:  T(C): 36.7 (10-28-24 @ 04:54), Max: 36.7 (10-28-24 @ 04:54)  T(F): 98 (10-28-24 @ 04:54), Max: 98 (10-28-24 @ 04:54)  HR: 65 (10-28-24 @ 04:54) (65 - 65)  BP: 92/57 (10-28-24 @ 04:54) (92/57 - 92/57)  RR: 16 (10-28-24 @ 04:54) (16 - 16)  SpO2: 99% (10-28-24 @ 04:54) (99% - 99%)  I&O's Summary    Physical and Neurological Examination:   - General: nad, supine in c collar     - Mental Status:   level of consciousness: Awake, alert  Orientation: Oriented to person, age, place, and time  Language: Speech is fluent with intact naming, repetition, and ability to follow commands (including simple commands and complex cross commands)  Cortical Signs: No extinction to visual or tactile DSS, no hemineglect.   good historian    HINTS  -Left beating nystagmus worse on left gaze, no skew, +corrective saccade    - Cranial Nerves:   PERRL, VFF, EOMI left beating nystagmus , facial sensation is intact in the V1-V3 distribution bilaterally; face is symmetric with nael smile; hearing is intact to finger rub bilaterally and equally; uvula is midline and soft palate rises symmetrically; shoulder shrug intact; tongue protrudes in the midline with intact lateral movements    - Motor Testing:  Bulk: Normal   Tone: Normal  Strength:  There is no pronator drift b/l  There is no leg drift b/l                              Right           Left  Should-Abduct      5                   5  Elbow-Flex             5                   5  Elbow-Ext              5                   5                        5                   5    Hip-Flex                 5                   5  Knee-Flex              5                   5  Knee-Ext                5                   5    - Sensory: Intact throughout to light touch.   - Coordination: Finger-nose-finger intact without dysmetria.  Heel shin intact no dysmetria, mildly trmulous in legs.   - Gait: Normal steps, base, arm swing,     Objective Studies                        13.4   10.76 )-----------( 152      ( 28 Oct 2024 05:32 )             39.3       10-28    139  |  106  |  14  ----------------------------<  139[H]  4.6   |  22  |  1.01    Ca    8.9      28 Oct 2024 05:32  Phos  2.9     10-28  Mg     1.90     10-28    TPro  6.5  /  Alb  3.7  /  TBili  0.8  /  DBili  x   /  AST  18  /  ALT  13  /  AlkPhos  62  10-28       PT/INR - ( 28 Oct 2024 05:32 )   PT: 11.8 sec;   INR: 0.99 ratio         PTT - ( 28 Oct 2024 05:32 )  PTT:33.2 sec        CTH  FINDINGS:    VENTRICLES AND SULCI: Age appropriate involutional changes.  INTRA-AXIAL: No mass effect, acute hemorrhage, or midline shift.  There   are periventricular and subcortical white matter hypodensities,   consistent with microvascular type changes.  EXTRA-AXIAL: No mass or fluid collection. Basal cisterns are normal in   appearance.    VISUALIZED SINUSES:  Mucosal thickening in the left maxillary and   bilateral sphenoidal sinuses.  TYMPANOMASTOID CAVITIES:  Clear.  VISUALIZED ORBITS: Bilateral lens replacement.  CALVARIUM: Intact.    MISCELLANEOUS: None.      IMPRESSION:  No acute intracranial hemorrhage, mass effect, or midline shift.    Findings were discussed with Dr. Carney 10/28/2024 5:49 AM by Dr. Mcbride   with read back confirmation.

## 2024-10-28 NOTE — ED ADULT NURSE NOTE - NSFALLRISKINTERV_ED_ALL_ED

## 2024-10-28 NOTE — ED PROVIDER NOTE - PROGRESS NOTE DETAILS
Ceci Kasper MD PGY-3: Recently had the flu shot.  Chest x-ray with questionable left-sided pneumonia/pleural effusion.  Will add procalcitonin will give ceftriaxone azithromycin to cover community-acquired.  Daughter states he is always coughing.  Spoke with neuro plan for DAPT and MRI/MRI for acute vertebral artery dissection.  Patient and family updated on all findings and in agreements for admission to the hospital.

## 2024-10-28 NOTE — H&P ADULT - PROBLEM SELECTOR PLAN 3
BP much improved now. Continue to hold BP meds.  -Trend VS.   -Avoid hypotension.  -Start diet. Consider fluids if he remains hypotensive.  -F/u orthostatic VS.

## 2024-10-28 NOTE — H&P ADULT - NSHPLABSRESULTS_GEN_ALL_CORE
13.4   10.76 )-----------( 152      ( 28 Oct 2024 05:32 )             39.3     10-28    139  |  106  |  14  ----------------------------<  139[H]  4.6   |  22  |  1.01    Ca    8.9      28 Oct 2024 05:32  Phos  2.9     10-28  Mg     1.90     10-28    TPro  6.5  /  Alb  3.7  /  TBili  0.8  /  DBili  x   /  AST  18  /  ALT  13  /  AlkPhos  62  10-28    PT/INR - ( 28 Oct 2024 05:32 )   PT: 11.8 sec;   INR: 0.99 ratio         PTT - ( 28 Oct 2024 05:32 )  PTT:33.2 sec  Urinalysis Basic - ( 28 Oct 2024 05:32 )    Color: x / Appearance: x / SG: x / pH: x  Gluc: 139 mg/dL / Ketone: x  / Bili: x / Urobili: x   Blood: x / Protein: x / Nitrite: x   Leuk Esterase: x / RBC: x / WBC x   Sq Epi: x / Non Sq Epi: x / Bacteria: x      RADIOLOGY:  Images from CT were reviewed. Report as below.  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 MRA   head and neck recommended for further evaluation.    CTA HEAD:  No large vessel occlusion, significant stenosis or vascular abnormality   identified.    Findings were discussed with Dr. Angela 10/28/2024 6:04 AM by Dr. Mcbride   with read back confirmation.    < end of copied text >      XR was personally reviewed interpreted.  < from: Xray Chest 1 View- PORTABLE-Urgent (10.28.24 @ 05:58) >    IMPRESSION:  Left lower lung opacities suggestive of underlying pneumonia, increased.   Small left effusion.    < end of copied text >      EKG: Personally reviewed/interpreted

## 2024-10-28 NOTE — ED ADULT NURSE REASSESSMENT NOTE - NS ED NURSE REASSESS COMMENT FT1
Pt lying in stretcher, NAD, respirations are even and unlabored, NSR on monitor, IV is patent and intact. No neuro deficits observed.

## 2024-10-28 NOTE — H&P ADULT - PROBLEM SELECTOR PLAN 9
-Lower suspicion for acute infection. CXR changes primarily on LLL - intervally worse when compared to 2021 study. Pt has no respiratory sx. RVP neg. Afebrile.  -No role for abx at this time.  -Could be related to hx of COVID, scarring related to infection in 2021.  -Discussed with radiology - would rec CT chest w/ contrast to better delineate structures - ordered

## 2024-10-28 NOTE — H&P ADULT - PROBLEM SELECTOR PLAN 4
-Stable. Continue statin.  -FLP in AM -Metabolic acidosis (pH 7.3, Lactic acid 2.3)  -Could be 2' hypoperfusion in setting of low BP.   -Can repeat lactic level. Can also check CK level (?seizure, although lower suspicion).

## 2024-10-28 NOTE — H&P ADULT - PROBLEM SELECTOR PLAN 2
-Currently asymptomatic. ?related to recent fall after syncopal episode.  -MRI brain, MRA head non con, MRA neck w/ contrast - dissection protocol.  -If any MS/symptom changes - stat CT.  -Neuro recs appreciated.

## 2024-10-28 NOTE — PHARMACOTHERAPY INTERVENTION NOTE - COMMENTS
Medication history is complete. Medication list updated in Outpatient Medication Record (OMR). Please call spectra [phone] if you have any questions.

## 2024-10-28 NOTE — ED PROVIDER NOTE - IV ALTEPLASE EXCL REL HIDDEN
I reviewed the H&P, I examined the patient, and there are no changes in the patient's condition.   show

## 2024-10-28 NOTE — H&P ADULT - PROBLEM SELECTOR PLAN 8
-Lower suspicion for acute infection. CXR changes primarily on LLL - intervally worse when compared to 2021 study. Pt has no respiratory sx. RVP neg. Afebrile.  -No role for abx at this time.  -Could be related to hx of COVID, scarring related to infection in 2021.  -Discussed with radiology - would rec CT chest w/ contrast to better delineate structures - ordered Likely reactive. Afebrile. No localizing s/s of infection.  -Trend labs.

## 2024-10-28 NOTE — H&P ADULT - NSHPPHYSICALEXAM_GEN_ALL_CORE
Vital Signs Last 24 Hrs  T(C): 36.9 (28 Oct 2024 08:23), Max: 36.9 (28 Oct 2024 08:23)  T(F): 98.4 (28 Oct 2024 08:23), Max: 98.4 (28 Oct 2024 08:23)  HR: 68 (28 Oct 2024 08:23) (65 - 86)  BP: 104/68 (28 Oct 2024 08:23) (92/57 - 125/79)  BP(mean): 92 (28 Oct 2024 07:27) (92 - 92)  RR: 18 (28 Oct 2024 08:23) (16 - 18)  SpO2: 99% (28 Oct 2024 08:23) (99% - 100%)    Parameters below as of 28 Oct 2024 08:23  Patient On (Oxygen Delivery Method): room air    Gen- In bed, NAD  Eyes- EOMI, PERRL, nonicteric.  Resp-CTAB, good effort.  CVS- RRR, S1S2, no g/r/m. No peripheral edema.  GI- Soft abd, NT, ND, +BSx4  Ext- No C/C.   Neuro- CN II-XII intact. Speech fluent/face symmetric. Moves all extremities. Sensations are intact throughout.

## 2024-10-28 NOTE — OCCUPATIONAL THERAPY INITIAL EVALUATION ADULT - LIVES WITH, PROFILE
family; in a house with no steps to enter. Pt reports his bedroom and bathroom on the ground floor.. No steps to negotiate inside the house.

## 2024-10-28 NOTE — H&P ADULT - HISTORY OF PRESENT ILLNESS
79M with PMH of HTN, HLD, BPH, ED presenting s/p syncopal episode @ 4AM. Episode was unwitnessed. He woke up early in the morning to use the rest room -- he felt dizziness and suddenly blacked out. Per family - episode was unwitnessed. He was discovered by his son after they heard a loud thump. Patient was found to be unresponsive. His eyes were opened. His lips were purple. No reports of seizure-like activities or bowel/urinary incontinence. Per collateral - pt likely struck the table in the kitchen. Pt also reporting room-spinning w/ positional head changes. Of note pt reports his BP was uncontrolled last month - so PMD added metoprolol back in September. He had been taking medication without any issues. Pt also reports using sildenafil occasionally Last time it was taken was 6PM (on the evening before fall). He has not had any prior issues w/ the medication. Patient reports being in his usual state of health. No recent illnesses. He denies any cough, sore throat, SOB, CP, palps, NV, abd pain, diarrhea, dysuria.     ED Course: 92/57, 65, 16, 98F, 99% RA. Stroke code. Azithro/CTX, ASA, Plavix

## 2024-10-28 NOTE — PHYSICAL THERAPY INITIAL EVALUATION ADULT - PERTINENT HX OF CURRENT PROBLEM, REHAB EVAL
Patient presented after being found on the floor due to possible syncopal episode and vertigo; pt denies pain or injury presently

## 2024-10-28 NOTE — H&P ADULT - PROBLEM SELECTOR PLAN 7
Likely reactive. Afebrile. No localizing s/s of infection.  -Trend labs. Stable. Continue tamsulosin.

## 2024-10-28 NOTE — OCCUPATIONAL THERAPY INITIAL EVALUATION ADULT - GENERAL OBSERVATIONS, REHAB EVAL
Pt received siting at the edge of the bed in NAD. Pt agreeable to participate in skilled OT evaluation and followed directions throughout the session. Vitals: BP: 101/58 mmHg; HR: 73 BPM.

## 2024-10-28 NOTE — CONSULT NOTE ADULT - ATTENDING COMMENTS
Mr. Varghese is a very pleasant 79-year-old man who has significant past medical history of vascular risk factors hypertension hyperlipidemia BPH.  He presents with an episode of syncope followed by disorientation for approximately 5 minutes.  He reports she had sudden onset of vertigo, no focal neurologic symptoms.  He feels he was back to baseline on the way to the hospital.  He also admits to taking sildenafil approximately 4 to 5 hours before the symptoms occurred.  His neurological exam is nonfocal.  Gait was not tested.  Impression:?  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  First need evaluation management by vestibular therapy for vertigo.  He will also get an brain MRI during the admission to rule out posterior circulation TIA/ischemia given that he has stroke risk factors.  In the meantime he will continue aspirin, statins and DVT prophylaxis  He is also being treated for pneumonia.

## 2024-10-28 NOTE — H&P ADULT - PROBLEM SELECTOR PLAN 1
-Suspected of hypotension in the setting of medication side effect (sildenafil use) v. vasovagal syncope v. orthostatic hypotension v. BPPV. Less likely CVA (NIHSS on arrival 0)  -Hold metoprolol. Counseled pt to hold sildenafil use until further evaluation (last dose 10/27- 6PM).  -FLP/A1c.  -Check orthostatics.  -ECHO.  -Tele to r/o arrhythmias.  -Falls precaution.  -PT evaluation.  -DVT ppx- Lovenox/SCD as per neuro  -ASA/Plavix daily for now as per neuro.  -Neuro recs appreciated.      #BPPV  -PT as above. ENT as OP if not improved. Vestibular rehab if needed.  -Hold off meds for now as pt asymptomatic.

## 2024-10-28 NOTE — ED PROVIDER NOTE - CARE PLAN
Report given to [facility], RN     Zone Phone: [phone]        Significant changes during shift:    Patient c/o pain. 2 mg morphine given, patient rested throughout the night.            Patient Information     Malia Morton  79 y.o.  1/27/2020  9:56 PM by Natalie Sherman MD. Malia Morton was admitted from Home     Problem List          Patient Active Problem List     Diagnosis Date Noted    Diarrhea 01/28/2020           Past Medical History:   Diagnosis Date    Arthritis      Perforated bowel ([address]      Sarcoidosis              Core Measures:     CVA: No No  CHF:No No  PNA:No No     Post Op Surgical (If Applicable):      Number times ambulated in hallway past shift:  NO  Number of times OOB to chair past shift:   NO  NG Tube: No  Incentive Spirometer: No  Drains: No   Volume    Dressing Present:  No  Flatus:  Not applicable     Activity Status:     OOB to Chair No  Ambulated this shift No   Bed Rest Yes     Supplemental O2: (If Applicable)     NC No  NRB No  Venti-mask No  On  Liters/min        LINES AND DRAINS:     Central Line? No      PICC LINE? No      Urinary Catheter? No      DVT prophylaxis:     DVT prophylaxis Med- Yes  DVT prophylaxis SCD or MEL- Refused      Wounds: (If Applicable)     Wounds- No     Location      Patient Safety:     Falls Score Total Score: 4  Safety Level_______  Bed Alarm On? Yes  Sitter?  No     Plan for upcoming shift:   abdominal xray tomorrow      Discharge Plan: probable home when stable     Active Consults:  IP CONSULT TO GENERAL SURGERY    1 Principal Discharge DX:	Dizziness

## 2024-10-28 NOTE — ED ADULT NURSE NOTE - CHIEF COMPLAINT QUOTE
Pt brought to ED for possible syncope or seizure. Per family, heard a thud upstairs and found his on ground with secretions from his mouth. Hypotensive for ems. BP 92/57(right arm), 116/62(L arm) Pt states he laid himself on ground and then doesn't remember anything else. C-collar from EMS, 4mg Zofrin IVP. 18G L arm.  Hx: HTN

## 2024-10-28 NOTE — ED PROVIDER NOTE - CLINICAL SUMMARY MEDICAL DECISION MAKING FREE TEXT BOX
[facility]
This is an 79-year-old male with a past medical history of hypertension hyperlipidemia BPH.  Denies blood thinner use.  Patient went to bed at 2 AM at that time he was feeling fine woke up at 4 AM to go to the bathroom where he noted a sudden onset of dizziness like the room was spinning and then he states the next thing he knew he was on the floor.  This is never happened before he did not have any chest pain or shortness of breath.  Patient has not had any recent illness he denies any fevers any chills any cough any cold any nausea any vomiting any diarrhea.  Patient stating that he is still having dizziness.    Due to onset of symptoms we will call code stroke.  Patient neurologically intact he has no visible nystagmus he is alert and oriented x 4 he has no visible signs of trauma.    General: Well appearing, well nourished, in no distress  Head: Normocephalic, atraumatic  Eyes: Conjunctiva clear, sclera non-icteric, EOM intact, PERRL  Mouth: Mucous membranes moist, no mucosal lesions.  Neck: Supple  Heart: Regular rate and rhythm, no murmur or gallop  Lungs: Clear to auscultation  Abdomen: Bowel sounds present, soft, no tenderness, non distended, no organomegaly, masses  Back: Spine normal without deformity or tenderness, no CVA tenderness  Extremities: No amputations or deformities, cyanosis, edema  Musculoskeletal: No crepitation, defects or decreased range of motion, strength intact throughout, pulses intact  Neurologic: No gross deficits CN II-XII intact Sensation intact No dysmetria   Psychiatric: Oriented X3, normal mood and affect  Skin: Warm,dry. Good turgor, no rash, unusual bruising, Cap refill <2 seconds

## 2024-10-28 NOTE — H&P ADULT - NSHPSOCIALHISTORY_GEN_ALL_CORE
Independent w/ ADLs.  Lives w/ son.  Denies tobacco/ETOH/drug use.  Does not requires assistive devices.

## 2024-10-28 NOTE — CONSULT NOTE ADULT - ASSESSMENT
~~~~~~~~~~~~~~~~~~~~~~~~~~~~~~~~~~~~~~~~~~~~~~~~~~~~~~~~~~~  Assessment:  -79yoRH-M PMHx HTN, HLD, BPH   -p/w short duration vertigo and then syncope when standing. Vertigo was reprouced with motion and resolved with rest  -VS w/ soft BP 92/57, HR 65, RR 16 satting well on RA afebrile  -Exam with left beating nystagmus worse on left gaze  -Labs pending  -CTH/CTA pending    Impression:  -Orthostatic hypotension, syncope, peripheral vertigo    Recommendations:  []PT Eval if remains inpatient   []Vestibular Rehab Referral   []ENT Referral   [][email or web address] for patient education   []Check orthostatic vitals   []IV Fluids (after orthostatic vitals) consider bolus and then maintenance fluids   []Meclizine if needed, would trial low dose. Given duration only seconds likely can defer entirely for now  No further inpatient/emergent neurological workup/treatment changes indicated. Neurology will sign off. Please call with any questions or concerns.  Thank you     Plan discussed with telestroke attending Dr. Jennifer Cespedes     Assessment:  -79yoRH-M PMHx HTN, HLD, BPH   -p/w short duration vertigo and then syncope when standing. Vertigo was reprouced with motion and resolved with rest  -VS w/ soft BP 92/57, HR 65, RR 16 satting well on RA afebrile. FSBG 139  -Exam with left beating nystagmus worse on left gaze  -Labs w/ mild leukocytosis to 10.76, normal coags, grossly normal CMP. 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 Given duration only seconds likely can defer entirely for now  -WIll discuss need for antiplatelets     Plan discussed with telestroke attending Dr. Jennifer Cespedes     Assessment:  -79yoRH-M PMHx HTN, HLD, BPH   -p/w short duration vertigo and then syncope when standing. Vertigo was reprouced with motion and resolved with rest  -VS w/ soft BP 92/57, HR 65, RR 16 satting well on RA afebrile. FSBG 139  -Exam with left beating nystagmus worse on left gaze  -Labs w/ mild leukocytosis to 10.76, normal coags, grossly normal CMP. pH 7.3, Lactate 2.3, pCO2 54, bicarb 22  -CXR pending read  -CTH unrevealing  -CTA w/ decreased opacification in right diminutive vert. 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 Given duration only seconds likely can defer entirely for now    #Vertebral dissection  []MRI brain w/o contrast  []MRA Head noncon  []MRA Neck with contrast - t1 fat saturation / dissection protocol    []TTE - no bubble necessary  []Telemetry to monitor for arrhythmia  []Check HgbA1C, fasting lipid panel, utox    []Aspirin 81mg PO daily  []Clopidogrel 75 mg PO daily for now     [] Frequent neuro-checks q4h and VS q4h; STAT CTH for change in neuro exam  [] Permissive HTN up to 220/110 for 24-48h from symptom onset followed by gradual normotension over 2-3 days   [] IVFs to maintain hydration while NPO  [] NPO unless passes dysphagia screen; swallow eval if fails  [] DVT ppx: Enoxaparin sq daily +  SCDs    Case discussed with telestroke attending Dr. Jennifer Cespedes Assessment:  -79yoRH-M PMHx HTN, HLD, BPH   -p/w short duration vertigo and then syncope when standing. Vertigo was reprouced with motion and resolved with rest  -VS w/ soft BP 92/57, HR 65, RR 16 satting well on RA afebrile. FSBG 139  -Exam with left beating nystagmus worse on left gaze  -Labs w/ mild leukocytosis to 10.76, normal coags, grossly normal CMP. pH 7.3, Lactate 2.3, pCO2 54, bicarb 22. CXR suggests LLL pneumonia  -CTH unrevealing. CTA w/ decreased opacification in right diminutive vert. Per rads concerning for dissection, awaiting final report. Question if chronic    Impression:  -Orthostatic hypotension, syncope, peripheral vertigo  -Possibly traumatic, possibly chronic, possibly artifactual vertebral dissection. Given exam, doubt the cause of his symptoms. Possibly sequelae of fall.     Recommendations:  #Peripheral vertigo  []PT Eval if remains inpatient   []Vestibular Rehab Referral   []ENT Referral   [][email or web address] for patient education   []Check orthostatic vitals   []IV Fluids (after orthostatic vitals) consider bolus and then maintenance fluids   []Meclizine if needed, would trial low dose. Given duration only seconds likely can defer entirely for now    #Vertebral dissection  []MRI brain w/o contrast  []MRA Head noncon  []MRA Neck with contrast - t1 fat saturation / dissection protocol    []TTE - no bubble necessary  []Telemetry to monitor for arrhythmia  []Check HgbA1C, fasting lipid panel, utox    []Aspirin 81mg PO daily  []Clopidogrel 75 mg PO daily for now     [] Frequent neuro-checks q4h and VS q4h; STAT CTH for change in neuro exam  [] Permissive HTN up to 220/110 for 24-48h from symptom onset followed by gradual normotension over 2-3 days   [] IVFs to maintain hydration while NPO  [] NPO unless passes dysphagia screen; swallow eval if fails  [] DVT ppx: Enoxaparin sq daily +  SCDs    Case discussed with telestroke attending Dr. Jennifer Cespedes Assessment:  -79yoRH-M PMHx HTN, HLD, BPH, recent flu shot & constipation resolved with laxatives   -p/w short duration vertigo and then syncope when standing. Vertigo was reproduce with motion and resolved with rest  -VS w/ soft BP 92/57, HR 65, RR 16 satting well on RA afebrile. FSBG 139  -Exam with left beating nystagmus worse on left gaze  -Labs w/ mild leukocytosis to 10.76, normal coags, grossly normal CMP. pH 7.3, Lactate 2.3, pCO2 54, bicarb 22. CXR suggests LLL pneumonia  -CTH unrevealing. CTA w/ decreased opacification in right diminutive vert. Per rads concerning for dissection, awaiting final report. Question if chronic    Impression:  -Orthostatic hypotension, syncope, peripheral vertigo  -Possibly traumatic, possibly chronic, possibly artifactual vertebral dissection. Given exam, doubt the cause of his symptoms. Possibly sequelae of fall.     Recommendations:  #Peripheral vertigo  []PT Eval if remains inpatient   []Vestibular Rehab Referral   []ENT Referral   [][email or web address] for patient education   []Check orthostatic vitals   []IV Fluids (after orthostatic vitals) consider bolus and then maintenance fluids   []Meclizine if needed, would trial low dose. Given duration only seconds likely can defer entirely for now    #Vertebral dissection  []MRI brain w/o contrast  []MRA Head noncon  []MRA Neck with contrast - t1 fat saturation / dissection protocol    []TTE - no bubble necessary  []Telemetry to monitor for arrhythmia  []Check HgbA1C, fasting lipid panel, utox    []Aspirin 81mg PO daily  []Clopidogrel 75 mg PO daily for now     [] Frequent neuro-checks q4h and VS q4h; STAT CTH for change in neuro exam  [] Permissive HTN up to 220/110 for 24-48h from symptom onset followed by gradual normotension over 2-3 days   [] IVFs to maintain hydration while NPO  [] NPO unless passes dysphagia screen; swallow eval if fails  [] DVT ppx: Enoxaparin sq daily +  SCDs    Case discussed with telestroke attending Dr. Jennifer Cespedes

## 2024-10-28 NOTE — ED ADULT NURSE NOTE - OBJECTIVE STATEMENT
Garth RN: Pt received at CT scan. Pt alert and oriented x 4, ambulatory at baseline. Hx of HTN, HLD, BPH. Pt brought in by EMS for possible syncopal episode or seizure. Per daughter at bedside, pt went to sleep around 2am and around 4am family heard a thud upstairs and found the pt on the ground. Pt found to have secretions coming from his mouth. Pt reports feeling a sudden onset of dizziness before LOC, Per triage note, EMS found pt to be hypotensive in the field. Pt received in C-collar. Pt denies dizziness worsens with movement. Respirations even and unlabored, NAD. Pt denies chest pain, shortness of breath, N/V/D, headache, fever, chills. Pt received with 18G IV in the L AC by EMS and 20G IV in the right AC. Labs drawn and sent per MD orders. Report given to primary RN Yuly.

## 2024-10-28 NOTE — ED ADULT TRIAGE NOTE - CHIEF COMPLAINT QUOTE
Pt brought to ED for possible syncope or seizure. Per family, heard a thud upstairs and found his on ground with secretions from his mouth. Pt states he laid himself on ground and then doesn't remember anthing else. C-collar from EMS, 4mg Zofrin IVP. 18G L arm.  Hx: HTN Pt brought to ED for possible syncope or seizure. Per family, heard a thud upstairs and found his on ground with secretions from his mouth. Hypotensive for ems. BP 92/57(right arm), 116/62(L arm) Pt states he laid himself on ground and then doesn't remember anything else. C-collar from EMS, 4mg Zofrin IVP. 18G L arm.  Hx: HTN

## 2024-10-28 NOTE — H&P ADULT - ASSESSMENT
79M with PMH of HTN, HLD, BPH, ED presenting s/p syncopal episode @ 4AM. Stroke code activated on arrival. At this time - lower suspicion for acute CVA. Imaging did reveal findings that may be suggestive of R cerebral artery dissection. Neuro on board. Admit for further workup.

## 2024-10-29 LAB
A1C WITH ESTIMATED AVERAGE GLUCOSE RESULT: 5.6 % — SIGNIFICANT CHANGE UP (ref 4–5.6)
ANION GAP SERPL CALC-SCNC: 13 MMOL/L — SIGNIFICANT CHANGE UP (ref 7–14)
BUN SERPL-MCNC: 12 MG/DL — SIGNIFICANT CHANGE UP (ref 7–23)
CALCIUM SERPL-MCNC: 8.7 MG/DL — SIGNIFICANT CHANGE UP (ref 8.4–10.5)
CHLORIDE SERPL-SCNC: 104 MMOL/L — SIGNIFICANT CHANGE UP (ref 98–107)
CHOLEST SERPL-MCNC: 149 MG/DL — SIGNIFICANT CHANGE UP
CO2 SERPL-SCNC: 22 MMOL/L — SIGNIFICANT CHANGE UP (ref 22–31)
CREAT SERPL-MCNC: 1 MG/DL — SIGNIFICANT CHANGE UP (ref 0.5–1.3)
EGFR: 76 ML/MIN/1.73M2 — SIGNIFICANT CHANGE UP
ESTIMATED AVERAGE GLUCOSE: 114 — SIGNIFICANT CHANGE UP
GLUCOSE SERPL-MCNC: 102 MG/DL — HIGH (ref 70–99)
HCT VFR BLD CALC: 38 % — LOW (ref 39–50)
HDLC SERPL-MCNC: 52 MG/DL — SIGNIFICANT CHANGE UP
HGB BLD-MCNC: 13 G/DL — SIGNIFICANT CHANGE UP (ref 13–17)
LEGIONELLA AG UR QL: NEGATIVE — SIGNIFICANT CHANGE UP
LIPID PNL WITH DIRECT LDL SERPL: 78 MG/DL — SIGNIFICANT CHANGE UP
MCHC RBC-ENTMCNC: 31.9 PG — SIGNIFICANT CHANGE UP (ref 27–34)
MCHC RBC-ENTMCNC: 34.2 GM/DL — SIGNIFICANT CHANGE UP (ref 32–36)
MCV RBC AUTO: 93.4 FL — SIGNIFICANT CHANGE UP (ref 80–100)
NON HDL CHOLESTEROL: 97 MG/DL — SIGNIFICANT CHANGE UP
NRBC # BLD: 0 /100 WBCS — SIGNIFICANT CHANGE UP (ref 0–0)
NRBC # FLD: 0 K/UL — SIGNIFICANT CHANGE UP (ref 0–0)
PLATELET # BLD AUTO: 156 K/UL — SIGNIFICANT CHANGE UP (ref 150–400)
POTASSIUM SERPL-MCNC: 4.5 MMOL/L — SIGNIFICANT CHANGE UP (ref 3.5–5.3)
POTASSIUM SERPL-SCNC: 4.5 MMOL/L — SIGNIFICANT CHANGE UP (ref 3.5–5.3)
RBC # BLD: 4.07 M/UL — LOW (ref 4.2–5.8)
RBC # FLD: 13.6 % — SIGNIFICANT CHANGE UP (ref 10.3–14.5)
SODIUM SERPL-SCNC: 139 MMOL/L — SIGNIFICANT CHANGE UP (ref 135–145)
TRIGL SERPL-MCNC: 95 MG/DL — SIGNIFICANT CHANGE UP
WBC # BLD: 7.92 K/UL — SIGNIFICANT CHANGE UP (ref 3.8–10.5)
WBC # FLD AUTO: 7.92 K/UL — SIGNIFICANT CHANGE UP (ref 3.8–10.5)

## 2024-10-29 PROCEDURE — 71260 CT THORAX DX C+: CPT | Mod: 26

## 2024-10-29 PROCEDURE — 99233 SBSQ HOSP IP/OBS HIGH 50: CPT

## 2024-10-29 PROCEDURE — 99222 1ST HOSP IP/OBS MODERATE 55: CPT

## 2024-10-29 RX ORDER — CHLORHEXIDINE GLUCONATE 40 MG/ML
1 SOLUTION TOPICAL DAILY
Refills: 0 | Status: DISCONTINUED | OUTPATIENT
Start: 2024-10-29 | End: 2024-10-30

## 2024-10-29 RX ADMIN — Medication 20 MILLIGRAM(S): at 21:20

## 2024-10-29 RX ADMIN — Medication 81 MILLIGRAM(S): at 11:53

## 2024-10-29 RX ADMIN — Medication 0.4 MILLIGRAM(S): at 21:19

## 2024-10-29 RX ADMIN — CLOPIDOGREL 75 MILLIGRAM(S): 75 TABLET ORAL at 11:53

## 2024-10-29 RX ADMIN — Medication 40 MILLIGRAM(S): at 18:45

## 2024-10-29 NOTE — CONSULT NOTE ADULT - ASSESSMENT
Patient is an 80 yo man with HTN, HLD, BPH.  He presented to the [facility] ED with complaints of vertigo and syncope.  No other associated cardiac complaints.  MRA head and neck noted no acute ischemic stroke, +Right vertebral dissection.    No identifiable triggers (ie neck hyperextension)  Patient denied prior history of aneurysms and dissections.  No significant FH of aneurysms and dissections.    When evaluated today, patient states that his symptoms are stable and improved since admission.    Impression:  Spontaneous right vertebral artery dissection  Symptoms likely attributed to vertebral artery dissection    Recommendations  Continue on DAPT, atorvastatin.  BP control  No further cardiovascular testing needed at this time.

## 2024-10-29 NOTE — PROGRESS NOTE ADULT - ATTENDING COMMENTS
No evidence of ischemia or infarction on MRI brain.  Differential diagnosis of dizziness includes peripheral versus posterior circulation TIA.  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  Conducted follow-up imaging as outpatient.  Continue aspirin 81 mg  PT OT PMR assessment

## 2024-10-29 NOTE — PROGRESS NOTE ADULT - NSPROGADDITIONALINFOA_GEN_ALL_CORE
Stroke fellow addendum 10/29/24:  79 RHM with HTN, DLD p/w concerns of syncope was found to have R V4 dissection confirmed on the MRI. No infarcts were seen in the MRI. Recommend continuing aspirin for the dissection. Can stop Plavix. Rest of the plan as above.

## 2024-10-29 NOTE — CONSULT NOTE ADULT - SUBJECTIVE AND OBJECTIVE BOX
Cardiology/Vascular Medicine Inpatient Consultation Note    Date of Admission:        CHIEF COMPLAINT:        HISTORY OF PRESENT ILLNESS:  HPI:  79M with PMH of HTN, HLD, BPH, ED presenting s/p syncopal episode @ 4AM. Episode was unwitnessed. He woke up early in the morning to use the rest room -- he felt dizziness and suddenly blacked out. Per family - episode was unwitnessed. He was discovered by his son after they heard a loud thump. Patient was found to be unresponsive. His eyes were opened. His lips were purple. No reports of seizure-like activities or bowel/urinary incontinence. Per collateral - pt likely struck the table in the kitchen. Pt also reporting room-spinning w/ positional head changes. Of note pt reports his BP was uncontrolled last month - so PMD added metoprolol back in September. He had been taking medication without any issues. Pt also reports using sildenafil occasionally Last time it was taken was 6PM (on the evening before fall). He has not had any prior issues w/ the medication. Patient reports being in his usual state of health. No recent illnesses. He denies any cough, sore throat, SOB, CP, palps, NV, abd pain, diarrhea, dysuria.     ED Course: 92/57, 65, 16, 98F, 99% RA. Stroke code. Azithro/CTX, ASA, Plavix (28 Oct 2024 09:45)          Allergies    No Known Allergies    Intolerances    	    MEDICATIONS:  aspirin enteric coated 81 milliGRAM(s) Oral daily  clopidogrel Tablet 75 milliGRAM(s) Oral daily  enoxaparin Injectable 40 milliGRAM(s) SubCutaneous every 24 hours            atorvastatin 20 milliGRAM(s) Oral at bedtime    tamsulosin 0.4 milliGRAM(s) Oral at bedtime      PAST MEDICAL & SURGICAL HISTORY:  HLD (hyperlipidemia)      History of BPH      HTN (hypertension)      No significant past surgical history          FAMILY HISTORY:  No pertinent family history in first degree relatives        SOCIAL HISTORY:    [ ] Non-smoker  [ ] Smoker  [ ] Alcohol    REVIEW OF SYSTEMS:  CONSTITUTIONAL: No fever, weight loss, or fatigue  EYES: No eye pain, visual disturbances, or discharge  ENMT:  No difficulty hearing, tinnitus, vertigo; No sinus or throat pain  NECK: No pain or stiffness  RESPIRATORY: No cough, wheezing, chills or hemoptysis; No Shortness of Breath  CARDIOVASCULAR: No chest pain, palpitations, passing out, dizziness, or leg swelling  GASTROINTESTINAL: No abdominal or epigastric pain. No nausea, vomiting, or hematemesis; No diarrhea or constipation. No melena or hematochezia.  GENITOURINARY: No dysuria, frequency, hematuria, or incontinence  NEUROLOGICAL: No headaches, memory loss, loss of strength, numbness, or tremors  SKIN: No itching, burning, rashes, or lesions   LYMPH Nodes: No enlarged glands  ENDOCRINE: No heat or cold intolerance; No hair loss  MUSCULOSKELETAL: No joint pain or swelling; No muscle, back, or extremity pain  PSYCHIATRIC: No depression, anxiety, mood swings, or difficulty sleeping  HEME/LYMPH: No easy bruising, or bleeding gums  ALLERY AND IMMUNOLOGIC: No hives or eczema	    [ ] All others negative	  [ ] Unable to obtain    PHYSICAL EXAM:  T(C): 36.9 (10-29-24 @ 05:00), Max: 37 (10-28-24 @ 16:16)  HR: 74 (10-29-24 @ 05:00) (68 - 77)  BP: 112/58 (10-29-24 @ 05:00) (104/68 - 135/55)  RR: 17 (10-29-24 @ 05:00) (17 - 18)  SpO2: 98% (10-29-24 @ 05:00) (98% - 100%)  Wt(kg): --  I&O's Summary      Appearance: Normal	  HEENT:   Normal oral mucosa, PERRL, EOMI	  Lymphatic: No lymphadenopathy  Cardiovascular: Normal S1 S2, No JVD, No murmurs, No edema  Respiratory: Lungs clear to auscultation	  Psychiatry: A & O x 3, Mood & affect appropriate  Gastrointestinal:  Soft, Non-tender, + BS	  Skin: No rashes, No ecchymoses, No cyanosis	  Neurologic: Non-focal  Extremities: Normal range of motion, No clubbing, cyanosis or edema  Vascular: Peripheral pulses palpable 2+ bilaterally      LABS:	 	    CBC Full  -  ( 29 Oct 2024 05:16 )  WBC Count : 7.92 K/uL  Hemoglobin : 13.0 g/dL  Hematocrit : 38.0 %  Platelet Count - Automated : 156 K/uL  Mean Cell Volume : 93.4 fL  Mean Cell Hemoglobin : 31.9 pg  Mean Cell Hemoglobin Concentration : 34.2 gm/dL  Auto Neutrophil # : x  Auto Lymphocyte # : x  Auto Monocyte # : x  Auto Eosinophil # : x  Auto Basophil # : x  Auto Neutrophil % : x  Auto Lymphocyte % : x  Auto Monocyte % : x  Auto Eosinophil % : x  Auto Basophil % : x    10-28    139  |  106  |  14  ----------------------------<  139[H]  4.6   |  22  |  1.01    Ca    8.9      28 Oct 2024 05:32  Phos  2.9     10-28  Mg     1.90     10-28    TPro  6.5  /  Alb  3.7  /  TBili  0.8  /  DBili  x   /  AST  18  /  ALT  13  /  AlkPhos  62  10-28      proBNP:   Lipid Profile:   HgA1c:   TSH:       CARDIAC MARKERS:            TELEMETRY: 	    ECG:   	  RADIOLOGY:  OTHER: 	      PREVIOUS DIAGNOSTIC CARDIOVASCULAR TESTING:      [ ]  Echocardiogram:  [ ]  Catheterization:  [ ]  Stress test:    [ ]  Vascular studies:  	  	     Cardiology/Vascular Medicine Inpatient Consultation Note    HPI:    Patient is an 80 yo man with HTN, HLD, BPH.  He presented to the [facility] ED with complaints of vertigo and syncope.  No other associated cardiac complaints.  MRA head and neck noted no acute ischemic stroke, +Right vertebral dissection.    No identifiable triggers (ie neck hyperextension)  Patient denied prior history of aneurysms and dissections.  No significant FH of aneurysms and dissections.    When evaluated today, patient states that his symptoms are stable and improved since admission.    Impression:  Spontaneous right vertebral artery dissection  Symptoms likely attributed to vertebral artery dissection    Recommendations  Continue on DAPT, atorvastatin.  BP control  No further cardiovascular testing needed at this time.    Allergies  No Known Allergies    MEDICATIONS:  aspirin enteric coated 81 milliGRAM(s) Oral daily  clopidogrel Tablet 75 milliGRAM(s) Oral daily  enoxaparin Injectable 40 milliGRAM(s) SubCutaneous every 24 hours  atorvastatin 20 milliGRAM(s) Oral at bedtime  tamsulosin 0.4 milliGRAM(s) Oral at bedtime    PAST MEDICAL & SURGICAL HISTORY:  HLD (hyperlipidemia)  History of BPH  HTN (hypertension)  No significant past surgical history          FAMILY HISTORY:  No pertinent family history in first degree relatives    SOCIAL HISTORY:    As above, as per chart notes    REVIEW OF SYSTEMS:  As above, as per chart notes    PHYSICAL EXAM:  T(C): 36.9 (10-29-24 @ 05:00), Max: 37 (10-28-24 @ 16:16)  HR: 74 (10-29-24 @ 05:00) (68 - 77)  BP: 112/58 (10-29-24 @ 05:00) (104/68 - 135/55)  RR: 17 (10-29-24 @ 05:00) (17 - 18)  SpO2: 98% (10-29-24 @ 05:00) (98% - 100%)    Appearance: NAD  HEENT:   No apparent JVD  Cardiovascular: Normal S1 S2  Respiratory: Lungs clear to auscultation	  Psychiatry: Awake, alert  Extremities: No LE edema      LABS:	 	    CBC Full  -  ( 29 Oct 2024 05:16 )  WBC Count : 7.92 K/uL  Hemoglobin : 13.0 g/dL  Hematocrit : 38.0 %  Platelet Count - Automated : 156 K/uL  Mean Cell Volume : 93.4 fL  Mean Cell Hemoglobin : 31.9 pg  Mean Cell Hemoglobin Concentration : 34.2 gm/dL  Auto Neutrophil # : x  Auto Lymphocyte # : x  Auto Monocyte # : x  Auto Eosinophil # : x  Auto Basophil # : x  Auto Neutrophil % : x  Auto Lymphocyte % : x  Auto Monocyte % : x  Auto Eosinophil % : x  Auto Basophil % : x    10-28    139  |  106  |  14  ----------------------------<  139[H]  4.6   |  22  |  1.01    Ca    8.9      28 Oct 2024 05:32  Phos  2.9     10-28  Mg     1.90     10-28    TPro  6.5  /  Alb  3.7  /  TBili  0.8  /  DBili  x   /  AST  18  /  ALT  13  /  AlkPhos  62  10-28    < from: MR Angio Neck w/ IV Cont (10.28.24 @ 18:51) >    ACC: [id number] EXAM:  MR ANGIO NECK IC   ORDERED BY: CARY JEFF     ACC: [id number] EXAM:  MR ANGIO BRAIN   ORDERED BY: CARY JEFF     ACC: [id number] EXAM:  MR BRAIN   ORDERED BY: CARY JEFF     PROCEDURE DATE:  10/28/2024          INTERPRETATION:  Clinical Indication: STROKE  Comparison: 10/28/2024    Technique: Multiplanar MR imaging of the brain was obtained with and   without contrast. Time of flight MRA of the neck and [place] of [place]   were obtained.    Contrast: Post-contrast MR images were obtained following infusion of 7   mL of Gadavist      Findings:    Brain MRI:  Diagnostic accuracy is limited secondary to patient motion.  There is no acute infarct. There is no evidence of mass or intracranial   hemorrhage. Ventricles and sulciare normal in size and configuration for   the patient's stated age. No midline shift or other significant mass   effect is noted. No area of abnormal enhancement.    Flow voids of the major intracranial vessels at the skull base follow   expected course and contour.    The paranasal sinuses demonstrate no signal abnormality. The mastoids   demonstrate no signal abnormality. Bilateral orbits are within normal   limits.    Brain MRA:    There is flow-related signal in the bilateral intradural internal   carotid, anterior cerebral and middle cerebral arteries.    There is flow-related signal in the bilateral posterior cerebral,   basilar, and intradural vertebral arteries.    No evidence of aneurysm. Tiny aneurysms can be beyond the resolution of   MRA technique. No evidence of arteriovenous malformation.    Neck MRA:    There is flow-related signal in the bilateral common carotid and cervical   internal carotid arteries. No hemodynamically significant stenosis of the   bilateral cervical ICAs by NASCET criteria.    Right vertebral artery dissection, extending from its origin to the V4   segment. There is flow-related signal in the left vertebral artery.    IMPRESSION:    Motion limited study. No evidence for intracranial mass, acute   territorial infarct, acute intracranial hemorrhage, or midline shift.No   area of abnormal enhancement.    Right vertebral artery dissection, extending from its origin to the V4   segment.    No hemodynamically significant stenosis in the head or intracranial   aneurysm.    --- End of Report ---            MAHSA SAHA MD; Attending Radiologist  This document has been electronically signed. Oct 28 2024  7:11PM    < end of copied text >  < from: TTE W or WO Ultrasound Enhancing Agent (10.28.24 @ 14:22) >    TRANSTHORACIC ECHOCARDIOGRAM REPORT  ________________________________________________________________________________                                      _______       Pt. Name:       SIMA ANDINO Study Date:    10/28/2024  MRN:            [id number]      YOB: 1945  Accession #:    [id number]      Age:           79 years  Account#:       [id number]       Gender:        M  Heart Rate:                    Height:        66.00 in (167.64 cm)  Rhythm:                        Weight:   154.00 lb (69.85 kg)  Blood Pressure: 104/68 mmHg    BSA/BMI:       1.79 m² / 24.86 kg/m²  ________________________________________________________________________________________  Referring Physician:    [id number] Hans Chang  Interpreting Physician: Kyle Dang M.D.  Primary Sonographer:    Jenniffer Snyder RDCS    CPT:                ECHO TTE WITH CON COMP W DOPP - .m;DEFINITY ECHO                      CONTRAST PER ML - .m  Indication(s):      Other cerebrovascular disease - I67.89  Procedure:          Transthoracic echocardiogram with 2-D, M-mode and complete                      spectral and color flow Doppler.  [facility]  Admission Status:   Inpatient  Contrast Injection: Verbal consent was obtained for injection of Ultrasonic                      Enhancing Agent following a discussion of risks and                      benefits.                      Endocardial visualization enhanced with 2 ml of Definity                      Ultrasound enhancing agent (Lot#:[id number] Exp.Date:6/25                      Discarded Dose:8ml).  UEA Reaction:       Patient had no adverse reaction after injection of                      Ultrasound Enhancing Agent.  Agitated Saline:    Injection with agitated saline was performed to evaluate for                      intracardiac shunting.  Study Information:  Image quality for this study is fair.    _______________________________________________________________________________________     CONCLUSIONS:      1. Left ventricular cavity is normal in size. Left ventricular wall thickness is normal. Left ventricular systolic function is normal with an ejection fraction of 64 % by Devlin's method of disks. There are no regional wall motion abnormalities seen.   2. There is mild (grade 1) left ventricular diastolic dysfunction.   3. Normal right ventricular cavity size and probably normal right ventricular systolic function.   4. Structurally normal mitral valve with normal leaflet excursion. There is calcification of themitral valve annulus. There is trace mitral regurgitation.   5. Indeterminate saline contrast injection results due to poor image quality.      < end of copied text >

## 2024-10-30 ENCOUNTER — TRANSCRIPTION ENCOUNTER (OUTPATIENT)
Age: 79
End: 2024-10-30

## 2024-10-30 VITALS
SYSTOLIC BLOOD PRESSURE: 112 MMHG | DIASTOLIC BLOOD PRESSURE: 63 MMHG | TEMPERATURE: 98 F | HEART RATE: 72 BPM | RESPIRATION RATE: 18 BRPM | OXYGEN SATURATION: 97 %

## 2024-10-30 LAB
MRSA PCR RESULT.: SIGNIFICANT CHANGE UP
S AUREUS DNA NOSE QL NAA+PROBE: SIGNIFICANT CHANGE UP

## 2024-10-30 RX ORDER — METOPROLOL TARTRATE 50 MG
1 TABLET ORAL
Refills: 0 | DISCHARGE

## 2024-10-30 RX ORDER — NAPROXEN 250 MG/1
1 TABLET ORAL
Refills: 0 | DISCHARGE

## 2024-10-30 RX ORDER — ASPIRIN/MAG CARB/ALUMINUM AMIN 325 MG
1 TABLET ORAL
Qty: 0 | Refills: 0 | DISCHARGE
Start: 2024-10-30

## 2024-10-30 RX ADMIN — Medication 81 MILLIGRAM(S): at 11:31

## 2024-10-30 RX ADMIN — CHLORHEXIDINE GLUCONATE 1 APPLICATION(S): 40 SOLUTION TOPICAL at 11:31

## 2024-10-30 NOTE — PROGRESS NOTE ADULT - SUBJECTIVE AND OBJECTIVE BOX
SUBJECTIVE / OVERNIGHT EVENTS:pt seen and examined  10-29-24     MEDICATIONS  (STANDING):  aspirin enteric coated 81 milliGRAM(s) Oral daily  atorvastatin 20 milliGRAM(s) Oral at bedtime  chlorhexidine 2% Cloths 1 Application(s) Topical daily  enoxaparin Injectable 40 milliGRAM(s) SubCutaneous every 24 hours  tamsulosin 0.4 milliGRAM(s) Oral at bedtime    MEDICATIONS  (PRN):    T(C): 36.8 (10-29-24 @ 21:20), Max: 36.9 (10-29-24 @ 05:00)  HR: 66 (10-29-24 @ 21:20) (66 - 84)  BP: 114/57 (10-29-24 @ 21:20) (112/58 - 137/58)  RR: 18 (10-29-24 @ 21:20) (17 - 18)  SpO2: 96% (10-29-24 @ 21:20) (95% - 98%)    CAPILLARY BLOOD GLUCOSE        I&O's Summary      Constitutional: No fever, fatigue  Skin: No rash.  Eyes: No recent vision problems or eye pain.  ENT: No congestion, ear pain, or sore throat.  Cardiovascular: No chest pain or palpation.  Respiratory: No cough, shortness of breath, congestion, or wheezing.  Gastrointestinal: No abdominal pain, nausea, vomiting, or diarrhea.  Genitourinary: No dysuria.  Musculoskeletal: No joint swelling.  Neurologic: No headache.    PHYSICAL EXAM:  GENERAL: NAD  EYES: EOMI, PERRLA  NECK: Supple, No JVD  CHEST/LUNG: dec breath sounds at bases  HEART:  S1 , S2 +  ABDOMEN: soft , bs+  EXTREMITIES:  no edema  NEUROLOGY:alert awake follows commands      LABS:                        13.0   7.92  )-----------( 156      ( 29 Oct 2024 05:16 )             38.0     10-29    139  |  104  |  12  ----------------------------<  102[H]  4.5   |  22  |  1.00    Ca    8.7      29 Oct 2024 05:16  Phos  2.9     10-28  Mg     1.90     10-28    TPro  6.5  /  Alb  3.7  /  TBili  0.8  /  DBili  x   /  AST  18  /  ALT  13  /  AlkPhos  62  10-28    PT/INR - ( 28 Oct 2024 05:32 )   PT: 11.8 sec;   INR: 0.99 ratio         PTT - ( 28 Oct 2024 05:32 )  PTT:33.2 sec      Urinalysis Basic - ( 29 Oct 2024 05:16 )    Color: x / Appearance: x / SG: x / pH: x  Gluc: 102 mg/dL / Ketone: x  / Bili: x / Urobili: x   Blood: x / Protein: x / Nitrite: x   Leuk Esterase: x / RBC: x / WBC x   Sq Epi: x / Non Sq Epi: x / Bacteria: x        RADIOLOGY & ADDITIONAL TESTS:    Imaging Personally Reviewed:    Consultant(s) Notes Reviewed:      Care Discussed with Consultants/Other Providers:  
SUBJECTIVE: Feeling better.     INTERVAL HISTORY: MRI/MRA done and reviewed.  Patient seen and examined at bedside.      PAST MEDICAL & SURGICAL HISTORY:  HLD (hyperlipidemia)  History of BPH  HTN (hypertension)  No significant past surgical history    FAMILY HISTORY:  No pertinent family history in first degree relatives      SOCIAL HISTORY:   T/E/D:   Occupation:   Lives with:     MEDICATIONS (HOME):  Home Medications:  metoprolol succinate 25 mg oral capsule, extended release: 1 cap(s) orally once a day (28 Oct 2024 16:51)  naproxen 375 mg oral tablet: 1 tab(s) orally every 12 hours (28 Oct 2024 16:51)  simvastatin 40 mg oral tablet: 1 tab(s) orally once a day (28 Oct 2024 16:51)  tamsulosin 0.4 mg oral capsule: 1 cap(s) orally once a day (at bedtime) (28 Oct 2024 16:51)    MEDICATIONS  (STANDING):  aspirin enteric coated 81 milliGRAM(s) Oral daily  atorvastatin 20 milliGRAM(s) Oral at bedtime  clopidogrel Tablet 75 milliGRAM(s) Oral daily  enoxaparin Injectable 40 milliGRAM(s) SubCutaneous every 24 hours  tamsulosin 0.4 milliGRAM(s) Oral at bedtime    MEDICATIONS  (PRN):    ALLERGIES/INTOLERANCES:  Allergies  No Known Allergies    Intolerances    VITALS & EXAMINATION:  Vital Signs Last 24 Hrs  T(C): 36.9 (29 Oct 2024 09:00), Max: 37 (28 Oct 2024 16:16)  T(F): 98.5 (29 Oct 2024 09:00), Max: 98.6 (28 Oct 2024 16:16)  HR: 67 (29 Oct 2024 09:00) (67 - 77)  BP: 113/44 (29 Oct 2024 09:00) (106/64 - 135/55)  RR: 18 (29 Oct 2024 09:00) (17 - 18)  SpO2: 95% (29 Oct 2024 09:00) (95% - 100%)    Parameters below as of 29 Oct 2024 09:00  Patient On (Oxygen Delivery Method): room air    General:  Constitutional: Male, appears stated age, in no apparent distress including pain  Head: Normocephalic & Atraumatic.  Respiratory: Breathing comfortably.  Extremities: No cyanosis, clubbing, or edema    Neurological:  MS: Eyes open, awake, alert, oriented to person, place, situation, time. Follows all commands.  Language: Speech is clear, fluent with good repetition & comprehension.  CNs:  VFF. EOMI no nystagmus. V1-3 intact to LT b/l. No facial asymmetry b/l, full eye closure strength b/l. Hearing grossly normal (rubbing fingers) b/l. Tongue midline, normal movements, no atrophy.   Motor: Normal muscle bulk & tone. No noticeable tremor. No UE/LE drifts b/l.    Sensation: Intact to LT b/l throughout.   Cortical: Extinction on DSS (neglect): none  Coordination: intact rapid-alt movements. No dysmetria to FTN/HTS  Gait: Deferred.      LABORATORY:  CBC                       13.0   7.92  )-----------( 156      ( 29 Oct 2024 05:16 )             38.0     Chem 10-29    139  |  104  |  12  ----------------------------<  102[H]  4.5   |  22  |  1.00    Ca    8.7      29 Oct 2024 05:16  Phos  2.9     10-28  Mg     1.90     10-28    TPro  6.5  /  Alb  3.7  /  TBili  0.8  /  DBili  x   /  AST  18  /  ALT  13  /  AlkPhos  62  10-28    LFTs LIVER FUNCTIONS - ( 28 Oct 2024 05:32 )  Alb: 3.7 g/dL / Pro: 6.5 g/dL / ALK PHOS: 62 U/L / ALT: 13 U/L / AST: 18 U/L / GGT: x           Coagulopathy PT/INR - ( 28 Oct 2024 05:32 )   PT: 11.8 sec;   INR: 0.99 ratio         PTT - ( 28 Oct 2024 05:32 )  PTT:33.2 sec  Lipid Panel 10-29 Chol 149 LDL -- HDL 52 Trig 95  A1c   Cardiac enzymes     U/A Urinalysis Basic - ( 29 Oct 2024 05:16 )    Color: x / Appearance: x / SG: x / pH: x  Gluc: 102 mg/dL / Ketone: x  / Bili: x / Urobili: x   Blood: x / Protein: x / Nitrite: x   Leuk Esterase: x / RBC: x / WBC x   Sq Epi: x / Non Sq Epi: x / Bacteria: x        Radiology (XR, CT, MR, U/S, TTE/FELIZ):    MRI brain ww/o 10/28/24  Small acute infarcts in the right cerebellum and high right frontal lobe. Given the different vascular distributions, embolic source should be considered. No mass effect or evidence for hemorrhagic transformation.    Moderate chronic microvascular ischemic changes and multiple bilateral chronic lacunar infarcts.    Multiple parenchymal chronic microhemorrhages which could represent sequelae of chronic hypertensive microangiopathy versus amyloid angiopathy.    CT PERFUSION:  Field-of-view extends through the posterior fossa and inferior brain to   the tops of the lateral ventricles.  Posterior superior frontal lobes and   large majority of the parietal lobes are excluded from the field-of-view.    No core infarct or penumbra of ischemic tissue is identified by CT   perfusion.    Follow-up MRI is recommended to exclude an infarct not detectable on CT   perfusion.    CTA NECK:  Patent cervical vasculature.    Atherosclerotic plaque at the carotid bifurcation/carotid bulb/proximal   internal carotid arteries bilaterally without significant stenosis based   on NASCET criteria.    There is a mild stenosis in the midcervical left internal carotid artery   caused by vessel tortuosity with kinking of the vessel lumen.    There is mild stenosis of the right vertebral artery origin.    There is suspected mild to moderate stenosis at the left vertebral artery   origin, within limits of moderate motion artifact.  There are mild   stenoses in the left V3 segment caused by vessel tortuosity and kinking   of the vessel lumen.    No evidence of a carotid or vertebral artery dissection.    Multiple thyroid nodules measuring up to 1.1 cm.).  In the absence of a   family history or risk factors for thyroid cancer, the American College   of Radiology does not recommend routine follow-up incidental thyroid   nodules measuring less than 1.5 cm in this age group.    CTA HEAD:  No major vessel occlusion about the [place] of [place].    There are mild stenoses in the intracranial internal carotid arteries   bilaterally and mild to moderate stenoses in the intradural vertebral   arteries bilaterally.  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    Follow-up with neuro-interventional radiology is recommended for   long-term management and further imaging.    TTE:     1. Left ventricular cavity is normal in size. Left ventricular wall thickness is normal. Left ventricular systolic function is normal with an ejection fraction of 64 % by Devlin's method of disks. There are no regional wall motion abnormalities seen.   2. There is mild (grade 1) left ventricular diastolic dysfunction.   3. Normal right ventricular cavity size and probably normal right ventricular systolic function.   4. Structurally normal mitral valve with normal leaflet excursion. There is calcification of the mitral valve annulus. There is trace mitral regurgitation.   5. Indeterminate saline contrast injection results due to poor image quality.
    SUBJECTIVE / OVERNIGHT EVENTS:pt seen and examined  10-30-24     MEDICATIONS  (STANDING):  aspirin enteric coated 81 milliGRAM(s) Oral daily  atorvastatin 20 milliGRAM(s) Oral at bedtime  chlorhexidine 2% Cloths 1 Application(s) Topical daily  enoxaparin Injectable 40 milliGRAM(s) SubCutaneous every 24 hours  tamsulosin 0.4 milliGRAM(s) Oral at bedtime    MEDICATIONS  (PRN):    Vital Signs Last 24 Hrs  T(C): 36.6 (10-30-24 @ 10:00), Max: 36.8 (10-29-24 @ 21:20)  T(F): 97.9 (10-30-24 @ 10:00), Max: 98.2 (10-29-24 @ 21:20)  HR: 72 (10-30-24 @ 10:00) (66 - 84)  BP: 112/63 (10-30-24 @ 10:00) (105/58 - 137/57)  BP(mean): --  RR: 18 (10-30-24 @ 10:00) (16 - 18)  SpO2: 97% (10-30-24 @ 10:00) (96% - 98%)    Constitutional: No fever, fatigue  Skin: No rash.  Eyes: No recent vision problems or eye pain.  ENT: No congestion, ear pain, or sore throat.  Cardiovascular: No chest pain or palpation.  Respiratory: No cough, shortness of breath, congestion, or wheezing.  Gastrointestinal: No abdominal pain, nausea, vomiting, or diarrhea.  Genitourinary: No dysuria.  Musculoskeletal: No joint swelling.  Neurologic: No headache.    PHYSICAL EXAM:  GENERAL: NAD  EYES: EOMI, PERRLA  NECK: Supple, No JVD  CHEST/LUNG: dec breath sounds at bases  HEART:  S1 , S2 +  ABDOMEN: soft , bs+  EXTREMITIES:  no edema  NEUROLOGY:alert awake follows commands  LABS:  10-29    139  |  104  |  12  ----------------------------<  102[H]  4.5   |  22  |  1.00    Ca    8.7      29 Oct 2024 05:16      Creatinine Trend: 1.00 <--, 1.01 <--                        13.0   7.92  )-----------( 156      ( 29 Oct 2024 05:16 )             38.0     Urine Studies:  Urinalysis Basic - ( 29 Oct 2024 05:16 )    Color:  / Appearance:  / SG:  / pH:   Gluc: 102 mg/dL / Ketone:   / Bili:  / Urobili:    Blood:  / Protein:  / Nitrite:    Leuk Esterase:  / RBC:  / WBC    Sq Epi:  / Non Sq Epi:  / Bacteria:                         RADIOLOGY & ADDITIONAL TESTS:    Imaging Personally Reviewed:    Consultant(s) Notes Reviewed:      Care Discussed with Consultants/Other Providers:

## 2024-10-30 NOTE — PROGRESS NOTE ADULT - PROBLEM SELECTOR PLAN 9
-Lower suspicion for acute infection. CXR changes primarily on LLL - intervally worse when compared to 2021 study. Pt has no respiratory sx. RVP neg. Afebrile.
-Lower suspicion for acute infection. CXR changes primarily on LLL - intervally worse when compared to 2021 study. Pt has no respiratory sx. RVP neg. Afebrile.
normal...

## 2024-10-30 NOTE — DISCHARGE NOTE NURSING/CASE MANAGEMENT/SOCIAL WORK - NSDCPEFALRISK_GEN_ALL_CORE
For information on Fall & Injury Prevention, visit: [email or web address] OR  [email or web address] OR  https://www.cdc.gov/steadi/patient.html

## 2024-10-30 NOTE — PROGRESS NOTE ADULT - PROBLEM SELECTOR PLAN 1
-Suspected of hypotension in the setting of medication side effect (sildenafil use) v. vasovagal syncope v. orthostatic hypotension v. BPPV. Less likely CVA (NIHSS on arrival 0)  -As per neuro , pt is clinically: Improved since admission  MRI without acute ischemic stroke, likely symptoms due to orthostatic vs BPPV.  MRA with R vertebral dissection.
-Suspected of hypotension in the setting of medication side effect (sildenafil use) v. vasovagal syncope v. orthostatic hypotension v. BPPV. Less likely CVA (NIHSS on arrival 0)  -As per neuro , pt is clinically: Improved since admission  MRI without acute ischemic stroke, likely symptoms due to orthostatic vs BPPV.  MRA with R vertebral dissection.  neurology cleared pt for dc

## 2024-10-30 NOTE — DISCHARGE NOTE PROVIDER - HOSPITAL COURSE
79M with PMH of HTN, HLD, BPH, ED presenting s/p syncopal episode @ 4AM. Stroke code activated on arrival. At this time - lower suspicion for acute CVA. Imaging did reveal findings that may be suggestive of R cerebral artery dissection. Neuro on board      Acute Vertigo/Syncope  - Hold metoprolol. Counseled pt to hold sildenafil use until further evaluation (last dose   - TTE: normal LVSF, mild diastolic dysfunction  - PT/OT: No needs    Vertebral artery dissection  - MRI B, MRAH/N: Motion limited study. No evidence for intracranial mass, acute territorial infarct, acute intracranial hemorrhage, or midline shift. No area of abnormal enhancement.  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  - Per neuro Recommend continuing aspirin for the dissection. Can stop Plavix.     Case discussed with  on 10/30/24. Patient is medically stable and optimized for discharge as per attending. All medications were reviewed and prescriptions were sent to a mutually agreed upon pharmacy. Discharge plan reviewed with patient and/or family.

## 2024-10-30 NOTE — DISCHARGE NOTE NURSING/CASE MANAGEMENT/SOCIAL WORK - FINANCIAL ASSISTANCE
[facility] provides services at a reduced cost to those who are determined to be eligible through [facility]’s financial assistance program. Information regarding [facility]’s financial assistance program can be found by going to [email or web address] or by calling [phone].

## 2024-10-30 NOTE — DISCHARGE NOTE PROVIDER - NSDCMRMEDTOKEN_GEN_ALL_CORE_FT
aspirin 81 mg oral delayed release tablet: 1 tab(s) orally once a day  simvastatin 40 mg oral tablet: 1 tab(s) orally once a day  tamsulosin 0.4 mg oral capsule: 1 cap(s) orally once a day (at bedtime)

## 2024-10-30 NOTE — PROGRESS NOTE ADULT - PROBLEM SELECTOR PLAN 8
Likely reactive. Afebrile. No localizing s/s of infection.  -Trend labs.
Likely reactive. Afebrile. No localizing s/s of infection.  -Trend labs.

## 2024-10-30 NOTE — PROGRESS NOTE ADULT - PROBLEM SELECTOR PLAN 6
-Advised to stop sildenafil pending further workup.
-Advised to stop sildenafil pending further workup.

## 2024-10-30 NOTE — PROGRESS NOTE ADULT - PROBLEM SELECTOR PLAN 2
-Currently asymptomatic. ?related to recent fall after syncopal episode.  neuro f/u
-Currently asymptomatic. ?related to recent fall after syncopal episode.  neuro f/u  pt asymptomatic at present

## 2024-10-30 NOTE — PROGRESS NOTE ADULT - PROBLEM SELECTOR PLAN 4
-Metabolic acidosis (pH 7.3, Lactic acid 2.3)  -Could be 2' hypoperfusion in setting of low BP.   improving
-Metabolic acidosis (pH 7.3, Lactic acid 2.3)  -Could be 2' hypoperfusion in setting of low BP.   improving

## 2024-10-30 NOTE — PROGRESS NOTE ADULT - ASSESSMENT
79yoRH-M PMHx HTN, HLD, BPH, recent flu shot & constipation resolved with laxatives ,p/w short duration vertigo and then syncope when standing. Vertigo was reproduce with motion and resolved with rest.    Impression: Improved since admission  MRI without acute ischemic stroke, likely symptoms due to orthostatic vs BPPV.  MRA with R vertebral dissection.      Recommendations:   [x] MRI brain w/o contrast, results as noted above   [x] MRA Head noncon, results as noted above   [x] MRA Neck with contrast - t1 fat saturation / dissection protocol, results as noted above     [x] TTE , results as noted above   [x]Telemetry to monitor for arrhythmia  [x] HgbA1C: 5.6, fasting lipid panel, utox  [x] Aspirin 81mg PO daily  [] Can discontinue plavix on 10/29  [x] Frequent neuro-checks q4h and VS q4h; STAT CTH for change in neuro exam  [] Normotension < 130/90   [x] DASH diet   [x] DVT ppx: Enoxaparin sq daily +  SCDs  [x] PT/OT: No skilled needs   [] Patient should follow up with Stroke NP, [place] Grace or Margarita Coles, in clinic at [address] [place] [address], [phone]. Please email [email or web address] w/ basic PHI.   [] Outpatient ENT follow up  [] From neurovascular standpoint patient cleared for discharge.     Patient seen and examined with stroke attending, Dr. Hall and team.      Please call with questions: [phone] 
79M with PMH of HTN, HLD, BPH, ED presenting s/p syncopal episode @ 4AM. Stroke code activated on arrival. At this time - lower suspicion for acute CVA. Imaging did reveal findings that may be suggestive of R cerebral artery dissection. Neuro on board. Admit for further workup. 
79M with PMH of HTN, HLD, BPH, ED presenting s/p syncopal episode @ 4AM. Stroke code activated on arrival. At this time - lower suspicion for acute CVA. Imaging did reveal findings that may be suggestive of R cerebral artery dissection. Neuro on board. Admit for further workup.

## 2024-10-30 NOTE — DISCHARGE NOTE PROVIDER - NSDCFUADDAPPT_GEN_ALL_CORE_FT
[facility] [place] [facility] [place]  [address] [place] [address]  Phone: [phone]  Fax: [phone]

## 2024-10-30 NOTE — DISCHARGE NOTE NURSING/CASE MANAGEMENT/SOCIAL WORK - PATIENT PORTAL LINK FT
You can access the FollowMyHealth Patient Portal offered by [facility] by registering at the following website: [email or web address]. By joining [employer]’s FollowMyHealth portal, you will also be able to view your health information using other applications (apps) compatible with our system.

## 2024-10-30 NOTE — DISCHARGE NOTE NURSING/CASE MANAGEMENT/SOCIAL WORK - NSDCFUADDAPPT_GEN_ALL_CORE_FT
[facility] [place] [facility] [place]  [address] [place] [address]  Phone: [phone]  Fax: [phone]

## 2024-10-30 NOTE — DISCHARGE NOTE PROVIDER - CARE PROVIDER_API CALL
Efrain Sheikh  [facility]  [address] [place] [address]  Phone: [phone]  Fax: [phone]  Follow Up Time:

## 2024-10-30 NOTE — DISCHARGE NOTE PROVIDER - NSDCCPCAREPLAN_GEN_ALL_CORE_FT
PRINCIPAL DISCHARGE DIAGNOSIS  Diagnosis: Dizziness  Assessment and Plan of Treatment: You came in for dizziness, you had an MRI done of your brain which showed no stroke however it did show a vertebral artery dissection. Please continue taking Aspirin, please STOP Plavix. Follow-up with Neurology as an outpatient     PRINCIPAL DISCHARGE DIAGNOSIS  Diagnosis: Dizziness  Assessment and Plan of Treatment: You came in for dizziness, you had an MRI done of your brain which showed no stroke however it did show a vertebral artery dissection. Please continue taking Aspirin, please STOP Plavix. Follow-up with Neurology as an outpatient, we have emailed [facility] for a follow-up appointment, they will reach out to you to schedule an appointment to establish care     PRINCIPAL DISCHARGE DIAGNOSIS  Diagnosis: Dizziness  Assessment and Plan of Treatment: You came in for dizziness, you had an MRI done of your brain which showed no stroke however it did show a vertebral artery dissection. Please continue taking Aspirin, please STOP Plavix. Follow-up with Neurology as an outpatient, we have emailed [facility] for a follow-up appointment, they will reach out to you to schedule an appointment to establish care      SECONDARY DISCHARGE DIAGNOSES  Diagnosis: Syncope  Assessment and Plan of Treatment: We have stopped your blood pressure medication Metoprolol, and your Sidenafil. Please do not restart these medications until speaking to your PCP

## 2024-11-18 PROBLEM — I10 ESSENTIAL (PRIMARY) HYPERTENSION: Chronic | Status: ACTIVE | Noted: 2024-10-28

## 2024-11-25 ENCOUNTER — APPOINTMENT (OUTPATIENT)
Dept: NEUROLOGY | Facility: CLINIC | Age: 79
End: 2024-11-25
Payer: MEDICARE

## 2024-11-25 VITALS
HEART RATE: 74 BPM | DIASTOLIC BLOOD PRESSURE: 71 MMHG | HEIGHT: 66 IN | BODY MASS INDEX: 24.75 KG/M2 | SYSTOLIC BLOOD PRESSURE: 132 MMHG | WEIGHT: 154 LBS

## 2024-11-25 DIAGNOSIS — I77.74 DISSECTION OF VERTEBRAL ARTERY: ICD-10-CM

## 2024-11-25 PROCEDURE — 99204 OFFICE O/P NEW MOD 45 MIN: CPT

## 2024-11-25 PROCEDURE — G2211 COMPLEX E/M VISIT ADD ON: CPT

## 2024-11-25 PROCEDURE — 99214 OFFICE O/P EST MOD 30 MIN: CPT

## 2025-02-01 ENCOUNTER — APPOINTMENT (OUTPATIENT)
Dept: MRI IMAGING | Facility: IMAGING CENTER | Age: 80
End: 2025-02-01

## 2025-02-01 ENCOUNTER — OUTPATIENT (OUTPATIENT)
Dept: OUTPATIENT SERVICES | Facility: HOSPITAL | Age: 80
LOS: 1 days | End: 2025-02-01
Payer: COMMERCIAL

## 2025-02-01 DIAGNOSIS — I77.74 DISSECTION OF VERTEBRAL ARTERY: ICD-10-CM

## 2025-02-01 PROCEDURE — A9585: CPT

## 2025-02-01 PROCEDURE — 70544 MR ANGIOGRAPHY HEAD W/O DYE: CPT | Mod: 26,XU

## 2025-02-01 PROCEDURE — 70549 MR ANGIOGRAPH NECK W/O&W/DYE: CPT

## 2025-02-01 PROCEDURE — 70549 MR ANGIOGRAPH NECK W/O&W/DYE: CPT | Mod: 26

## 2025-02-01 PROCEDURE — 70551 MRI BRAIN STEM W/O DYE: CPT

## 2025-02-01 PROCEDURE — 70551 MRI BRAIN STEM W/O DYE: CPT | Mod: 26

## 2025-02-01 PROCEDURE — 70544 MR ANGIOGRAPHY HEAD W/O DYE: CPT

## 2025-02-04 ENCOUNTER — NON-APPOINTMENT (OUTPATIENT)
Age: 80
End: 2025-02-04

## 2025-03-04 NOTE — ED ADULT TRIAGE NOTE - GLASGOW COMA SCALE: BEST MOTOR RESPONSE, MLM
(M6) obeys commands
The patient has been re-examined and I agree with the above assessment or I updated with my findings.
